# Patient Record
Sex: FEMALE | Race: AMERICAN INDIAN OR ALASKA NATIVE | NOT HISPANIC OR LATINO | Employment: PART TIME | URBAN - METROPOLITAN AREA
[De-identification: names, ages, dates, MRNs, and addresses within clinical notes are randomized per-mention and may not be internally consistent; named-entity substitution may affect disease eponyms.]

---

## 2021-10-08 ENCOUNTER — HISTORICAL (OUTPATIENT)
Dept: HEMATOLOGY/ONCOLOGY | Facility: CLINIC | Age: 46
End: 2021-10-08
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
## 2022-08-08 ENCOUNTER — TELEPHONE (OUTPATIENT)
Dept: ADMINISTRATIVE | Facility: HOSPITAL | Age: 47
End: 2022-08-08
Payer: MEDICAID

## 2022-08-08 NOTE — TELEPHONE ENCOUNTER
CT Chest/Abd/Pel[place] [facility] [place] 8:30 arrival   Patient is aware of appt

## 2022-08-15 ENCOUNTER — OFFICE VISIT (OUTPATIENT)
Dept: HEMATOLOGY/ONCOLOGY | Facility: CLINIC | Age: 47
End: 2022-08-15
Payer: MEDICAID

## 2022-08-15 VITALS
SYSTOLIC BLOOD PRESSURE: 164 MMHG | OXYGEN SATURATION: 100 % | RESPIRATION RATE: 18 BRPM | HEIGHT: 68 IN | TEMPERATURE: 99 F | DIASTOLIC BLOOD PRESSURE: 71 MMHG | HEART RATE: 68 BPM | WEIGHT: 293 LBS | BODY MASS INDEX: 44.41 KG/M2

## 2022-08-15 DIAGNOSIS — C18.4 MALIGNANT NEOPLASM OF TRANSVERSE COLON: Primary | ICD-10-CM

## 2022-08-15 PROCEDURE — 3078F DIAST BP <80 MM HG: CPT | Mod: CPTII,,, | Performed by: INTERNAL MEDICINE

## 2022-08-15 PROCEDURE — 3008F PR BODY MASS INDEX (BMI) DOCUMENTED: ICD-10-PCS | Mod: CPTII,,, | Performed by: INTERNAL MEDICINE

## 2022-08-15 PROCEDURE — 4010F ACE/ARB THERAPY RXD/TAKEN: CPT | Mod: CPTII,,, | Performed by: INTERNAL MEDICINE

## 2022-08-15 PROCEDURE — 4010F PR ACE/ARB THEARPY RXD/TAKEN: ICD-10-PCS | Mod: CPTII,,, | Performed by: INTERNAL MEDICINE

## 2022-08-15 PROCEDURE — 1160F PR REVIEW ALL MEDS BY PRESCRIBER/CLIN PHARMACIST DOCUMENTED: ICD-10-PCS | Mod: CPTII,,, | Performed by: INTERNAL MEDICINE

## 2022-08-15 PROCEDURE — 1160F RVW MEDS BY RX/DR IN RCRD: CPT | Mod: CPTII,,, | Performed by: INTERNAL MEDICINE

## 2022-08-15 PROCEDURE — 99214 OFFICE O/P EST MOD 30 MIN: CPT | Mod: ,,, | Performed by: INTERNAL MEDICINE

## 2022-08-15 PROCEDURE — 3077F PR MOST RECENT SYSTOLIC BLOOD PRESSURE >= 140 MM HG: ICD-10-PCS | Mod: CPTII,,, | Performed by: INTERNAL MEDICINE

## 2022-08-15 PROCEDURE — 3077F SYST BP >= 140 MM HG: CPT | Mod: CPTII,,, | Performed by: INTERNAL MEDICINE

## 2022-08-15 PROCEDURE — 1159F PR MEDICATION LIST DOCUMENTED IN MEDICAL RECORD: ICD-10-PCS | Mod: CPTII,,, | Performed by: INTERNAL MEDICINE

## 2022-08-15 PROCEDURE — 1159F MED LIST DOCD IN RCRD: CPT | Mod: CPTII,,, | Performed by: INTERNAL MEDICINE

## 2022-08-15 PROCEDURE — 3078F PR MOST RECENT DIASTOLIC BLOOD PRESSURE < 80 MM HG: ICD-10-PCS | Mod: CPTII,,, | Performed by: INTERNAL MEDICINE

## 2022-08-15 PROCEDURE — 99214 PR OFFICE/OUTPT VISIT, EST, LEVL IV, 30-39 MIN: ICD-10-PCS | Mod: ,,, | Performed by: INTERNAL MEDICINE

## 2022-08-15 PROCEDURE — 3008F BODY MASS INDEX DOCD: CPT | Mod: CPTII,,, | Performed by: INTERNAL MEDICINE

## 2022-08-15 RX ORDER — ERGOCALCIFEROL 1.25 MG/1
CAPSULE ORAL
COMMUNITY
Start: 2022-08-11
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
## 2022-11-14 NOTE — PROGRESS NOTES
Subjective:       Patient ID: Isamar Calabrese is a 47 y.o. female.    Chief Complaint: Follow-up (Lab results review, pt states that she has been having some abdominal pain all day but it is worst at night. )      Diagnosis:  - Colon ADC IIA T3 N0 G2     Treatment History:  - status post resection 06/2021    Current Treatment:   - surveillance      HPI      Isamar Calabrese  47 y.o.  female with past medical history significant for HLD, HTN, AKIL on CPAP, chronic headaches, arthritis. 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  Status post resection, but no use of postoperative adjuvant chemotherapy.    Interval History:     Patient here for follow-up.  She just had a colonoscopy done on 10/25/2022 showing a polyp.  She is been recommended to follow up in 3 years for repeat colonoscopy.  Denies any blood in the bowel movements.  Denies any nausea vomiting or constipation.  Denies any weight loss.  She has chronic pain in the right lower quadrant which had been persistent after the surgery.          Past Medical History:   Diagnosis Date    ADHD (attention deficit hyperactivity disorder)     Anxiety     Arthritis     Cancer     Chronic headaches     Colon cancer     Diabetes mellitus, type 2     HLD (hyperlipidemia)     Hypertension     AKIL (obstructive sleep apnea)     Vaginal wall prolapse       Past Surgical History:   Procedure Laterality Date    ABLATION OF MEDIAL BRANCH NERVE OF LUMBAR SPINE FACET JOINT Bilateral 11/4/2021    Procedure: ABLATION, NERVE, FACET JOINT, LUMBAR, MEDIAL BRANCH l3,l4,l5;  Surgeon: Anny Betancur MD;  Location: [facility] MAIN OR;  Service: Pain Management;  Laterality: Bilateral;    CARPAL TUNNEL RELEASE Right 05/13/2022    CHOLECYSTECTOMY      COLON SURGERY      INJECTION OF JOINT Bilateral 9/20/2022    Procedure: Injection, Joint, Knee;  Surgeon: Anny Betancur MD;  Location: [facility] MAIN OR;  Service: Pain Management;  Laterality:  Bilateral;     Social History     Socioeconomic History    Marital status: Single   Tobacco Use    Smoking status: Never    Smokeless tobacco: Never   Substance and Sexual Activity    Alcohol use: Yes     Comment: every once in a while/ rare     Drug use: Never      Family History   Problem Relation Age of Onset    Colon polyps Mother     Uterine cancer Maternal Grandmother       Review of patient's allergies indicates:   Allergen Reactions    Hydroxyzine Hives      Review of Systems   Constitutional:  Negative for appetite change and unexpected weight change.   HENT:  Negative for mouth sores.    Eyes:  Negative for visual disturbance.   Respiratory:  Negative for cough and shortness of breath.    Cardiovascular:  Negative for chest pain.   Gastrointestinal:  Positive for abdominal pain and diarrhea.   Genitourinary:  Positive for frequency.   Musculoskeletal:  Negative for back pain.   Integumentary:  Negative for rash.   Neurological:  Negative for headaches.   Hematological:  Negative for adenopathy.   Psychiatric/Behavioral:  The patient is not nervous/anxious.        Objective:        Vitals:    11/15/22 1057   BP: 136/89   Pulse: 73   Resp: 18   Temp: 98 °F (36.7 °C)        Physical Exam  Constitutional:       General: She is not in acute distress.     Appearance: She is well-developed. She is obese. She is not ill-appearing.   HENT:      Head: Normocephalic and atraumatic.      Mouth/Throat:      Mouth: Mucous membranes are moist.   Eyes:      Extraocular Movements: Extraocular movements intact.      Conjunctiva/sclera: Conjunctivae normal.   Cardiovascular:      Rate and Rhythm: Normal rate and regular rhythm.      Heart sounds: Normal heart sounds.   Pulmonary:      Effort: Pulmonary effort is normal. No respiratory distress.      Breath sounds: Normal breath sounds. No wheezing.   Abdominal:      General: Bowel sounds are normal. There is no distension.      Palpations: Abdomen is soft.      Tenderness:  There is no abdominal tenderness.   Musculoskeletal:         General: Normal range of motion.      Cervical back: Normal range of motion and neck supple.   Skin:     General: Skin is warm.   Neurological:      General: No focal deficit present.      Mental Status: She is alert and oriented to person, place, and time. Mental status is at baseline.      Cranial Nerves: No cranial nerve deficit.   Psychiatric:         Mood and Affect: Mood normal.       LABS AND IMAGING REVIEWED IN EPIC  2022:  WBC 11.61, hemoglobin 12.6, hematocrit 38, CEA less than 1.73 which is lower limit of detectable  2022:  WBC 10.04, hemoglobin 12.3, hematocrit 30.0, CEA less than 1.73 which is lower limit of detectable  21 WBC 16.4 Hg 9.7  ANC 12.2  6/3/21 WBC 12 RBC 4.33 Hg 12.9 MCV 91  ANC 8.6 Cr 0.8 Alb 3.4  2021: WBC 11.4, hemoglobin 11.1, platelet 245, CEA < 1.7  December 15, 2021 WBC count 11.57, hemoglobin 11.1, platelet count 174,AST, ALT within normal limits, normal bilirubin.  2022: WBC count 10.13, hemoglobin 11.6, MCV 86.7,. Count 172, creatinine 0.7, AST ALT and alkaline phosphatase within normal limits, CEA< 1.7.    IMAGIN21 CT A/P w/ contrast: Marked circumferential wall thickening involving the right colon at hepatic flexure measuring 4x3.6cm highly suspicious for primary malignancy. Associated constipation involving the proximal right colon with dilatation of proximal right colon. 8mm nodular left adrenal. 7mm mesenteric LN near hepatic flexure, likely reactive    6/3/21 CT A/P w/ contrast: normal liver, spleen, pancreas and adrenals. Large stool burden in right colon. Colonic diverticulosis. Left kidney larger than right. Simple cyst in right kidney.    CT chest with contrast 2021: No evidence of metastatic disease, incidental finding of 4 mm nonspecific sclerotic lesion in the lower sternum likely representing a benign bone island. Status post cholecystectomy      2021 : CT chest abdomen pelvis with contrast with a soft tissue thickening measuring 4 x 3.3 cm with a somewhat masslike configuration without discrete borders, benign postoperative changes versus residual tumor between the loop of distal ileum and lateral abdominal wall. Chest without evidence of tumor or metastatic disease.    1/27/2022 CT abdomen pelvis with contrast: Persistent soft tissue thickening/stranding in the right side of the abdomen, the site of partial colectomy, slightly less pronounced than previously but no discrete mass, findings likely reflect benign postoperative change/fibrosis. A repeat CT of abdomen could be obtained in 6 months to assess for stability, no other significant interval change. No evidence of metastatic disease.    8/10/22 CT CAP:  No acute pulmonary disease or adenopathy in the chest.  Soft tissue prominence at the site of prior ileocolic anastomosis in the right colon appears decreased in comparison.  This finding is less concerning for possible residual/recurrent malignancy    PATH:  6/8/21 terminal ileum, cecum, ascending and transverse colon resection: invasive colonic adenocarcinoma G2. 6.7cm. Extends into subserosal adipose tissue. Multiple discrete tubular adenomas with LGD. Appendix free of tumor. Surgical margins negative, closest radial at 3.5cm. No perforation, LVI-, PNI-, tumor deposits-. 0/28 LNs.  pT3 pN0 G2. Proficient MMR      Assessment:     ECOG Performance status: 0    Cancer of transverse colon C18.4 Stage IIA G2 pT3N0 transverse colon adenocarcinoma w/ no obstruction, no perforation, no LVI, no PNI, negative margins and 0/28 LNs s/p right hemicolectomy 6/8/21.   - Postop CEA less than 1.7. Noted pathology with proficient MMR  - Patient has had a genetic testing done which reported mutations patient has met with genetic counselor with recommendations for testing her children. Her children have been recommended to undergo colonoscopy starting the age of 25.  -  Patient had a colonoscopy in December 2021 at [facility], was found to have 4 polyps which are benign in nature. Will obtain records from pathology.  - colonoscopy 10/25/2022; polyp noted.  Repeat colonoscopy in 3 years       Plan:     - Protonix prescribed.    - repeat CT chest abdomen pelvis before next visit  - Follow-up recheck in 3 months with CBC, CMP, and CEA on RTC.    Continue to follow patient at three-month intervals into the 2 year nicole, then we can stretch out interval visits to six-month nicole until year 5 and then just once a year.      The patient was seen, interviewed and examined. Pertinent lab and radiology studies were reviewed. Pt instructed to call should develop concerning signs/symptoms or have further questions.       Jo Jackson MD  Hematology / Oncology

## 2022-11-15 ENCOUNTER — OFFICE VISIT (OUTPATIENT)
Dept: HEMATOLOGY/ONCOLOGY | Facility: CLINIC | Age: 47
End: 2022-11-15
Payer: MEDICAID

## 2022-11-15 VITALS
WEIGHT: 293 LBS | DIASTOLIC BLOOD PRESSURE: 89 MMHG | RESPIRATION RATE: 18 BRPM | SYSTOLIC BLOOD PRESSURE: 136 MMHG | BODY MASS INDEX: 47.09 KG/M2 | HEART RATE: 73 BPM | TEMPERATURE: 98 F | OXYGEN SATURATION: 99 %

## 2022-11-15 DIAGNOSIS — C18.4 MALIGNANT NEOPLASM OF TRANSVERSE COLON: Primary | ICD-10-CM

## 2022-11-15 DIAGNOSIS — C18.7 MALIGNANT NEOPLASM OF SIGMOID COLON: ICD-10-CM

## 2022-11-15 PROCEDURE — 3079F DIAST BP 80-89 MM HG: CPT | Mod: CPTII,,, | Performed by: INTERNAL MEDICINE

## 2022-11-15 PROCEDURE — 99214 OFFICE O/P EST MOD 30 MIN: CPT | Mod: ,,, | Performed by: INTERNAL MEDICINE

## 2022-11-15 PROCEDURE — 99214 PR OFFICE/OUTPT VISIT, EST, LEVL IV, 30-39 MIN: ICD-10-PCS | Mod: ,,, | Performed by: INTERNAL MEDICINE

## 2022-11-15 PROCEDURE — 3008F BODY MASS INDEX DOCD: CPT | Mod: CPTII,,, | Performed by: INTERNAL MEDICINE

## 2022-11-15 PROCEDURE — 3075F PR MOST RECENT SYSTOLIC BLOOD PRESS GE 130-139MM HG: ICD-10-PCS | Mod: CPTII,,, | Performed by: INTERNAL MEDICINE

## 2022-11-15 PROCEDURE — 3008F PR BODY MASS INDEX (BMI) DOCUMENTED: ICD-10-PCS | Mod: CPTII,,, | Performed by: INTERNAL MEDICINE

## 2022-11-15 PROCEDURE — 4010F ACE/ARB THERAPY RXD/TAKEN: CPT | Mod: CPTII,,, | Performed by: INTERNAL MEDICINE

## 2022-11-15 PROCEDURE — 3075F SYST BP GE 130 - 139MM HG: CPT | Mod: CPTII,,, | Performed by: INTERNAL MEDICINE

## 2022-11-15 PROCEDURE — 4010F PR ACE/ARB THEARPY RXD/TAKEN: ICD-10-PCS | Mod: CPTII,,, | Performed by: INTERNAL MEDICINE

## 2022-11-15 PROCEDURE — 3079F PR MOST RECENT DIASTOLIC BLOOD PRESSURE 80-89 MM HG: ICD-10-PCS | Mod: CPTII,,, | Performed by: INTERNAL MEDICINE

## 2022-11-15 PROCEDURE — 1159F PR MEDICATION LIST DOCUMENTED IN MEDICAL RECORD: ICD-10-PCS | Mod: CPTII,,, | Performed by: INTERNAL MEDICINE

## 2022-11-15 PROCEDURE — 1159F MED LIST DOCD IN RCRD: CPT | Mod: CPTII,,, | Performed by: INTERNAL MEDICINE

## 2022-11-15 RX ORDER — PANTOPRAZOLE SODIUM 40 MG/1
40 TABLET, DELAYED RELEASE ORAL DAILY
Qty: 30 TABLET | Refills: 2 | Status: SHIPPED | OUTPATIENT
Start: 2022-11-15 | End: 2023-05-17

## 2023-02-14 ENCOUNTER — OFFICE VISIT (OUTPATIENT)
Dept: HEMATOLOGY/ONCOLOGY | Facility: CLINIC | Age: 48
End: 2023-02-14
Payer: MEDICAID

## 2023-02-14 VITALS
BODY MASS INDEX: 44.41 KG/M2 | WEIGHT: 293 LBS | DIASTOLIC BLOOD PRESSURE: 56 MMHG | RESPIRATION RATE: 20 BRPM | HEIGHT: 68 IN | SYSTOLIC BLOOD PRESSURE: 133 MMHG | OXYGEN SATURATION: 99 % | TEMPERATURE: 98 F | HEART RATE: 96 BPM

## 2023-02-14 DIAGNOSIS — C18.4 MALIGNANT NEOPLASM OF TRANSVERSE COLON: Primary | ICD-10-CM

## 2023-02-14 PROCEDURE — 1159F MED LIST DOCD IN RCRD: CPT | Mod: CPTII,,,

## 2023-02-14 PROCEDURE — 3008F BODY MASS INDEX DOCD: CPT | Mod: CPTII,,,

## 2023-02-14 PROCEDURE — 3078F PR MOST RECENT DIASTOLIC BLOOD PRESSURE < 80 MM HG: ICD-10-PCS | Mod: CPTII,,,

## 2023-02-14 PROCEDURE — 4010F PR ACE/ARB THEARPY RXD/TAKEN: ICD-10-PCS | Mod: CPTII,,,

## 2023-02-14 PROCEDURE — 99215 PR OFFICE/OUTPT VISIT, EST, LEVL V, 40-54 MIN: ICD-10-PCS | Mod: ,,,

## 2023-02-14 PROCEDURE — 1159F PR MEDICATION LIST DOCUMENTED IN MEDICAL RECORD: ICD-10-PCS | Mod: CPTII,,,

## 2023-02-14 PROCEDURE — 3078F DIAST BP <80 MM HG: CPT | Mod: CPTII,,,

## 2023-02-14 PROCEDURE — 3075F SYST BP GE 130 - 139MM HG: CPT | Mod: CPTII,,,

## 2023-02-14 PROCEDURE — 99215 OFFICE O/P EST HI 40 MIN: CPT | Mod: ,,,

## 2023-02-14 PROCEDURE — 3075F PR MOST RECENT SYSTOLIC BLOOD PRESS GE 130-139MM HG: ICD-10-PCS | Mod: CPTII,,,

## 2023-02-14 PROCEDURE — 4010F ACE/ARB THERAPY RXD/TAKEN: CPT | Mod: CPTII,,,

## 2023-02-14 PROCEDURE — 3008F PR BODY MASS INDEX (BMI) DOCUMENTED: ICD-10-PCS | Mod: CPTII,,,

## 2023-02-14 RX ORDER — LANSOPRAZOLE 30 MG/1
30 CAPSULE, DELAYED RELEASE ORAL DAILY
COMMUNITY

## 2023-02-14 NOTE — PROGRESS NOTES
Subjective:       Patient ID: Isamar Calabrese is a 47 y.o. female.    Chief Complaint: No chief complaint on file.        Diagnosis:  - Colon ADC IIA T3 N0 G2     Treatment History:  - status post resection 06/2021    Current Treatment:   - surveillance      Follow-up  Associated symptoms include abdominal pain. Pertinent negatives include no chest pain, coughing, headaches or rash.       Isamar Calabrese  47 y.o.  female with past medical history significant for HLD, HTN, AKIL on CPAP, chronic headaches, arthritis. 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  Status post resection, but no use of postoperative adjuvant chemotherapy.    Interval History:     Patient here for follow-up.  She just had a colonoscopy done on 10/25/2022 showing a polyp.  She has been recommended to follow up in 3 years for repeat colonoscopy.  She continues with chronic diarrhea for the last 8-9 years, s/p cholecystectomy. She does follow GI for management of diarrhea and GERD. She has a f/u next week. Denies any blood in the bowel movements.  Denies any nausea vomiting or constipation.  Denies any weight loss.  She has chronic pain in the right lower quadrant which had been persistent after the surgery.          Past Medical History:   Diagnosis Date    ADHD (attention deficit hyperactivity disorder)     Anxiety     Arthritis     Cancer     Chronic headaches     Colon cancer     Diabetes mellitus, type 2     HLD (hyperlipidemia)     Hypertension     AKIL (obstructive sleep apnea)     Vaginal wall prolapse       Past Surgical History:   Procedure Laterality Date    ABLATION OF MEDIAL BRANCH NERVE OF LUMBAR SPINE FACET JOINT Bilateral 11/4/2021    Procedure: ABLATION, NERVE, FACET JOINT, LUMBAR, MEDIAL BRANCH l3,l4,l5;  Surgeon: Anny Betancur MD;  Location: [facility] MAIN OR;  Service: Pain Management;  Laterality: Bilateral;    CARPAL TUNNEL RELEASE Right 05/13/2022    CHOLECYSTECTOMY       COLON SURGERY      INJECTION OF JOINT Bilateral 9/20/2022    Procedure: Injection, Joint, Knee;  Surgeon: Anny Betancur MD;  Location: [facility] MAIN OR;  Service: Pain Management;  Laterality: Bilateral;     Social History     Socioeconomic History    Marital status: Single   Tobacco Use    Smoking status: Never    Smokeless tobacco: Never   Substance and Sexual Activity    Alcohol use: Yes     Comment: every once in a while/ rare     Drug use: Never      Family History   Problem Relation Age of Onset    Colon polyps Mother     Uterine cancer Maternal Grandmother       Review of patient's allergies indicates:   Allergen Reactions    Hydroxyzine Hives      Review of Systems   Constitutional:  Negative for appetite change and unexpected weight change.   HENT:  Negative for mouth sores.    Eyes:  Negative for visual disturbance.   Respiratory:  Negative for cough and shortness of breath.    Cardiovascular:  Negative for chest pain.   Gastrointestinal:  Positive for abdominal pain and diarrhea.   Genitourinary:  Positive for frequency.   Musculoskeletal:  Negative for back pain.   Integumentary:  Negative for rash.   Neurological:  Negative for headaches.   Hematological:  Negative for adenopathy.   Psychiatric/Behavioral:  The patient is not nervous/anxious.        Objective:        Vitals:    02/14/23 1104   BP: (!) 133/56   Pulse: 96   Resp: 20   Temp: 98.1 °F (36.7 °C)          Physical Exam  Constitutional:       General: She is not in acute distress.     Appearance: She is well-developed. She is obese. She is not ill-appearing.   HENT:      Head: Normocephalic and atraumatic.      Mouth/Throat:      Mouth: Mucous membranes are moist.   Eyes:      Extraocular Movements: Extraocular movements intact.      Conjunctiva/sclera: Conjunctivae normal.   Cardiovascular:      Rate and Rhythm: Normal rate and regular rhythm.      Heart sounds: Normal heart sounds.   Pulmonary:      Effort: Pulmonary effort is normal. No  respiratory distress.      Breath sounds: Normal breath sounds. No wheezing.   Abdominal:      General: Bowel sounds are normal. There is no distension.      Palpations: Abdomen is soft.      Tenderness: There is no abdominal tenderness.   Musculoskeletal:         General: Normal range of motion.      Cervical back: Normal range of motion and neck supple.   Skin:     General: Skin is warm.   Neurological:      General: No focal deficit present.      Mental Status: She is alert and oriented to person, place, and time. Mental status is at baseline.      Cranial Nerves: No cranial nerve deficit.   Psychiatric:         Mood and Affect: Mood normal.       LABS AND IMAGING REVIEWED IN EPIC  23 CEA <1.73Cr 0.85, alb 3.4, ca 9.29, LFTs WNL, wbc 9.84, hgb 12.4, plt 159, ANC 6.77  2022:  WBC 11.61, hemoglobin 12.6, hematocrit 38, CEA less than 1.73 which is lower limit of detectable  2022:  WBC 10.04, hemoglobin 12.3, hematocrit 30.0, CEA less than 1.73 which is lower limit of detectable  21 WBC 16.4 Hg 9.7  ANC 12.2  6/3/21 WBC 12 RBC 4.33 Hg 12.9 MCV 91  ANC 8.6 Cr 0.8 Alb 3.4  2021: WBC 11.4, hemoglobin 11.1, platelet 245, CEA < 1.7  December 15, 2021 WBC count 11.57, hemoglobin 11.1, platelet count 174,AST, ALT within normal limits, normal bilirubin.  2022: WBC count 10.13, hemoglobin 11.6, MCV 86.7,. Count 172, creatinine 0.7, AST ALT and alkaline phosphatase within normal limits, CEA< 1.7.    IMAGIN21 CT A/P w/ contrast: Marked circumferential wall thickening involving the right colon at hepatic flexure measuring 4x3.6cm highly suspicious for primary malignancy. Associated constipation involving the proximal right colon with dilatation of proximal right colon. 8mm nodular left adrenal. 7mm mesenteric LN near hepatic flexure, likely reactive    6/3/21 CT A/P w/ contrast: normal liver, spleen, pancreas and adrenals. Large stool burden in right colon. Colonic  diverticulosis. Left kidney larger than right. Simple cyst in right kidney.    CT chest with contrast 9/1/2021: No evidence of metastatic disease, incidental finding of 4 mm nonspecific sclerotic lesion in the lower sternum likely representing a benign bone island. Status post cholecystectomy    December 9, 2021 : CT chest abdomen pelvis with contrast with a soft tissue thickening measuring 4 x 3.3 cm with a somewhat masslike configuration without discrete borders, benign postoperative changes versus residual tumor between the loop of distal ileum and lateral abdominal wall. Chest without evidence of tumor or metastatic disease.    1/27/2022 CT abdomen pelvis with contrast: Persistent soft tissue thickening/stranding in the right side of the abdomen, the site of partial colectomy, slightly less pronounced than previously but no discrete mass, findings likely reflect benign postoperative change/fibrosis. A repeat CT of abdomen could be obtained in 6 months to assess for stability, no other significant interval change. No evidence of metastatic disease.    8/10/22 CT CAP:  No acute pulmonary disease or adenopathy in the chest.  Soft tissue prominence at the site of prior ileocolic anastomosis in the right colon appears decreased in comparison.  This finding is less concerning for possible residual/recurrent malignancy    02/09/22 CT CAP: No evidence of metastatic disease. 3.8 cm right ovarian noted.    PATH:  6/8/21 terminal ileum, cecum, ascending and transverse colon resection: invasive colonic adenocarcinoma G2. 6.7cm. Extends into subserosal adipose tissue. Multiple discrete tubular adenomas with LGD. Appendix free of tumor. Surgical margins negative, closest radial at 3.5cm. No perforation, LVI-, PNI-, tumor deposits-. 0/28 LNs.  pT3 pN0 G2. Proficient MMR      Assessment:     ECOG Performance status: 0    Cancer of transverse colon C18.4 Stage IIA G2 pT3N0 transverse colon adenocarcinoma w/ no obstruction, no  perforation, no LVI, no PNI, negative margins and 0/28 LNs s/p right hemicolectomy 6/8/21.   - Postop CEA less than 1.7. Noted pathology with proficient MMR  - Patient has had a genetic testing done which reported mutations patient has met with genetic counselor with recommendations for testing her children. Her children have been recommended to undergo colonoscopy starting the age of 25.  - Patient had a colonoscopy in December 2021 at [facility], was found to have 4 polyps which are benign in nature. Will obtain records from pathology.  - colonoscopy 10/25/2022; polyp noted.  Repeat colonoscopy in 3 years       Plan:     - CT faxed to Dr. Parisi her gynecologist for evaluation right ovarian cyst.  - repeat CT chest abdomen pelvis before next visit  - Follow-up recheck in 3 months with CBC, CMP, and CEA on RTC.      Continue to follow patient at three-month intervals into the 2 year nicole, then we can stretch out interval visits to six-month nicole until year 5 and then just once a year.

## 2023-05-17 ENCOUNTER — OFFICE VISIT (OUTPATIENT)
Dept: HEMATOLOGY/ONCOLOGY | Facility: CLINIC | Age: 48
End: 2023-05-17
Payer: MEDICAID

## 2023-05-17 VITALS
TEMPERATURE: 98 F | WEIGHT: 293 LBS | SYSTOLIC BLOOD PRESSURE: 143 MMHG | OXYGEN SATURATION: 98 % | HEIGHT: 68 IN | DIASTOLIC BLOOD PRESSURE: 77 MMHG | BODY MASS INDEX: 44.41 KG/M2 | RESPIRATION RATE: 20 BRPM | HEART RATE: 52 BPM

## 2023-05-17 DIAGNOSIS — C18.4 MALIGNANT NEOPLASM OF TRANSVERSE COLON: Primary | ICD-10-CM

## 2023-05-17 PROCEDURE — 3077F SYST BP >= 140 MM HG: CPT | Mod: CPTII,,, | Performed by: NURSE PRACTITIONER

## 2023-05-17 PROCEDURE — 1160F RVW MEDS BY RX/DR IN RCRD: CPT | Mod: CPTII,,, | Performed by: NURSE PRACTITIONER

## 2023-05-17 PROCEDURE — 3077F PR MOST RECENT SYSTOLIC BLOOD PRESSURE >= 140 MM HG: ICD-10-PCS | Mod: CPTII,,, | Performed by: NURSE PRACTITIONER

## 2023-05-17 PROCEDURE — 4010F ACE/ARB THERAPY RXD/TAKEN: CPT | Mod: CPTII,,, | Performed by: NURSE PRACTITIONER

## 2023-05-17 PROCEDURE — 1159F MED LIST DOCD IN RCRD: CPT | Mod: CPTII,,, | Performed by: NURSE PRACTITIONER

## 2023-05-17 PROCEDURE — 3078F PR MOST RECENT DIASTOLIC BLOOD PRESSURE < 80 MM HG: ICD-10-PCS | Mod: CPTII,,, | Performed by: NURSE PRACTITIONER

## 2023-05-17 PROCEDURE — 3008F PR BODY MASS INDEX (BMI) DOCUMENTED: ICD-10-PCS | Mod: CPTII,,, | Performed by: NURSE PRACTITIONER

## 2023-05-17 PROCEDURE — 99214 OFFICE O/P EST MOD 30 MIN: CPT | Mod: ,,, | Performed by: NURSE PRACTITIONER

## 2023-05-17 PROCEDURE — 99214 PR OFFICE/OUTPT VISIT, EST, LEVL IV, 30-39 MIN: ICD-10-PCS | Mod: ,,, | Performed by: NURSE PRACTITIONER

## 2023-05-17 PROCEDURE — 3008F BODY MASS INDEX DOCD: CPT | Mod: CPTII,,, | Performed by: NURSE PRACTITIONER

## 2023-05-17 PROCEDURE — 1160F PR REVIEW ALL MEDS BY PRESCRIBER/CLIN PHARMACIST DOCUMENTED: ICD-10-PCS | Mod: CPTII,,, | Performed by: NURSE PRACTITIONER

## 2023-05-17 PROCEDURE — 4010F PR ACE/ARB THEARPY RXD/TAKEN: ICD-10-PCS | Mod: CPTII,,, | Performed by: NURSE PRACTITIONER

## 2023-05-17 PROCEDURE — 1159F PR MEDICATION LIST DOCUMENTED IN MEDICAL RECORD: ICD-10-PCS | Mod: CPTII,,, | Performed by: NURSE PRACTITIONER

## 2023-05-17 PROCEDURE — 3078F DIAST BP <80 MM HG: CPT | Mod: CPTII,,, | Performed by: NURSE PRACTITIONER

## 2023-05-17 RX ORDER — DEXLANSOPRAZOLE 60 MG/1
1 CAPSULE, DELAYED RELEASE ORAL DAILY
COMMUNITY
Start: 2023-04-26

## 2023-05-17 NOTE — PROGRESS NOTES
Subjective:       Patient ID: Isamar Calabrese is a 48 y.o. female.    Chief Complaint: Follow-up (Pt c/o pain on rt side)        Diagnosis:  - Colon ADC IIA T3 N0 G2     Treatment History:  - status post resection 06/2021    Current Treatment:   - surveillance      Follow-up  Associated symptoms include abdominal pain. Pertinent negatives include no chest pain, coughing, headaches or rash.       Isamar Calabrese  48 y.o.  female with past medical history significant for HLD, HTN, AKIL on CPAP, chronic headaches, arthritis. 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  Status post resection, but no use of postoperative adjuvant chemotherapy.    Interval History:     Patient here for follow-up. She states that she is doing well overall without any new complaints. She did have a MMG in 4/27/2023 and follow-up MMG and US 5/2023 that showed a small mass within the right breast, she is scheduled for a biopsy 5/25/23 She had a colonoscopy done on 10/25/2022 showing a polyp.  She has been recommended to follow up in 3 years for repeat colonoscopy.  She continues with chronic diarrhea for the last 8-9 years, s/p cholecystectomy. She does follow GI for management of diarrhea and GERD.  Denies any blood in the bowel movements.  Denies any nausea/vomiting/constipation or weight loss.  She has chronic pain in the right lower quadrant which had been persistent after the surgery. Denies night sweats, bleeding, shortness of breath, fever, chest pain, or cough.          Past Medical History:   Diagnosis Date    ADHD (attention deficit hyperactivity disorder)     Anxiety     Arthritis     Cancer     Chronic headaches     Colon cancer     Diabetes mellitus, type 2     HLD (hyperlipidemia)     Hypertension     AKIL (obstructive sleep apnea)     Vaginal wall prolapse       Past Surgical History:   Procedure Laterality Date    ABLATION OF MEDIAL BRANCH NERVE OF LUMBAR SPINE FACET JOINT  Bilateral 11/4/2021    Procedure: ABLATION, NERVE, FACET JOINT, LUMBAR, MEDIAL BRANCH l3,l4,l5;  Surgeon: Anny Betancur MD;  Location: [facility] MAIN OR;  Service: Pain Management;  Laterality: Bilateral;    CARPAL TUNNEL RELEASE Right 05/13/2022    CHOLECYSTECTOMY      COLON SURGERY      INJECTION OF JOINT Bilateral 9/20/2022    Procedure: Injection, Joint, Knee;  Surgeon: Anny Betancur MD;  Location: [facility] MAIN OR;  Service: Pain Management;  Laterality: Bilateral;     Social History     Socioeconomic History    Marital status: Single   Tobacco Use    Smoking status: Never    Smokeless tobacco: Never   Substance and Sexual Activity    Alcohol use: Yes     Comment: every once in a while/ rare     Drug use: Never      Family History   Problem Relation Age of Onset    Colon polyps Mother     Uterine cancer Maternal Grandmother       Review of patient's allergies indicates:   Allergen Reactions    Hydroxyzine Hives      Review of Systems   Constitutional:  Negative for appetite change and unexpected weight change.   HENT:  Negative for mouth sores.    Eyes:  Negative for visual disturbance.   Respiratory:  Negative for cough and shortness of breath.    Cardiovascular:  Negative for chest pain.   Gastrointestinal:  Positive for abdominal pain and diarrhea.   Musculoskeletal:  Negative for back pain.   Integumentary:  Negative for rash.   Neurological:  Negative for headaches.   Hematological:  Negative for adenopathy.   Psychiatric/Behavioral:  The patient is not nervous/anxious.        Objective:        Vitals:    05/17/23 1001   BP: (!) 143/77   Pulse: (!) 52   Resp: 20   Temp: 98 °F (36.7 °C)          Physical Exam  Constitutional:       General: She is not in acute distress.     Appearance: She is well-developed. She is obese. She is not ill-appearing.   HENT:      Head: Normocephalic and atraumatic.      Mouth/Throat:      Mouth: Mucous membranes are moist.   Eyes:      Extraocular Movements: Extraocular movements  intact.      Conjunctiva/sclera: Conjunctivae normal.   Cardiovascular:      Rate and Rhythm: Normal rate and regular rhythm.      Heart sounds: Normal heart sounds.   Pulmonary:      Effort: Pulmonary effort is normal. No respiratory distress.      Breath sounds: Normal breath sounds. No wheezing.   Abdominal:      General: Bowel sounds are normal. There is no distension.      Palpations: Abdomen is soft.      Tenderness: There is no abdominal tenderness.   Musculoskeletal:         General: Normal range of motion.      Cervical back: Normal range of motion and neck supple.   Skin:     General: Skin is warm.      Comments: Skin graft right side   Neurological:      General: No focal deficit present.      Mental Status: She is alert and oriented to person, place, and time. Mental status is at baseline.      Cranial Nerves: No cranial nerve deficit.   Psychiatric:         Mood and Affect: Mood normal.       LABS AND IMAGING REVIEWED IN Epic  23 CEA 1.8 Cr 0.87, alb 3.2, ca 9.18 corrected 998 , LFTs WNL, wbc 11.42 , hgb 11.8, plt 166, ANC 8.08  23 CEA <1.73Cr 0.85, alb 3.4, ca 9.29, LFTs WNL, wbc 9.84, hgb 12.4, plt 159, ANC 6.77  2022:  WBC 11.61, hemoglobin 12.6, hematocrit 38, CEA less than 1.73 which is lower limit of detectable  2022:  WBC 10.04, hemoglobin 12.3, hematocrit 30.0, CEA less than 1.73 which is lower limit of detectable  21 WBC 16.4 Hg 9.7  ANC 12.2  6/3/21 WBC 12 RBC 4.33 Hg 12.9 MCV 91  ANC 8.6 Cr 0.8 Alb 3.4  2021: WBC 11.4, hemoglobin 11.1, platelet 245, CEA < 1.7  December 15, 2021 WBC count 11.57, hemoglobin 11.1, platelet count 174,AST, ALT within normal limits, normal bilirubin.  2022: WBC count 10.13, hemoglobin 11.6, MCV 86.7,. Count 172, creatinine 0.7, AST ALT and alkaline phosphatase within normal limits, CEA< 1.7.    IMAGIN21 CT A/P w/ contrast: Marked circumferential wall thickening involving the right colon at hepatic flexure  measuring 4x3.6cm highly suspicious for primary malignancy. Associated constipation involving the proximal right colon with dilatation of proximal right colon. 8mm nodular left adrenal. 7mm mesenteric LN near hepatic flexure, likely reactive    6/3/21 CT A/P w/ contrast: normal liver, spleen, pancreas and adrenals. Large stool burden in right colon. Colonic diverticulosis. Left kidney larger than right. Simple cyst in right kidney.    CT chest with contrast 9/1/2021: No evidence of metastatic disease, incidental finding of 4 mm nonspecific sclerotic lesion in the lower sternum likely representing a benign bone island. Status post cholecystectomy    December 9, 2021 : CT chest abdomen pelvis with contrast with a soft tissue thickening measuring 4 x 3.3 cm with a somewhat masslike configuration without discrete borders, benign postoperative changes versus residual tumor between the loop of distal ileum and lateral abdominal wall. Chest without evidence of tumor or metastatic disease.    1/27/2022 CT abdomen pelvis with contrast: Persistent soft tissue thickening/stranding in the right side of the abdomen, the site of partial colectomy, slightly less pronounced than previously but no discrete mass, findings likely reflect benign postoperative change/fibrosis. A repeat CT of abdomen could be obtained in 6 months to assess for stability, no other significant interval change. No evidence of metastatic disease.    8/10/22 CT CAP:  No acute pulmonary disease or adenopathy in the chest.  Soft tissue prominence at the site of prior ileocolic anastomosis in the right colon appears decreased in comparison.  This finding is less concerning for possible residual/recurrent malignancy    02/09/23 CT CAP: No evidence of metastatic disease. 3.8 cm right ovarian noted.    PATH:  6/8/21 terminal ileum, cecum, ascending and transverse colon resection: invasive colonic adenocarcinoma G2. 6.7cm. Extends into subserosal adipose tissue.  Multiple discrete tubular adenomas with LGD. Appendix free of tumor. Surgical margins negative, closest radial at 3.5cm. No perforation, LVI-, PNI-, tumor deposits-. 0/28 LNs.  pT3 pN0 G2. Proficient MMR      Assessment:     ECOG Performance status: 0    Cancer of transverse colon C18.4 Stage IIA G2 pT3N0 transverse colon adenocarcinoma w/ no obstruction, no perforation, no LVI, no PNI, negative margins and 0/28 LNs s/p right hemicolectomy 6/8/21.   - Postop CEA less than 1.7. Noted pathology with proficient MMR  - Patient has had a genetic testing done which reported mutations patient has met with genetic counselor with recommendations for testing her children. Her children have been recommended to undergo colonoscopy starting the age of 25.  - Patient had a colonoscopy in December 2021 at [facility], was found to have 4 polyps which are benign in nature. Will obtain records from pathology.  - colonoscopy 10/25/2022; polyp noted.  Repeat colonoscopy in 3 years       Plan:     - Abnormal MMG, pt to have biopsy 5/24/23  - repeat CT chest abdomen pelvis before next visit- insurance denied previous request for scans due to pt having scan done in 2/2023   - Follow-up recheck in 3 months with CBC, CMP, and CEA CT CAP on RTC.      Continue to follow patient at three-month intervals into the 2 year nicole, then we can stretch out interval visits to six-month nicole until year 5 and then just once a year.    Pt instructed to call in the interim for any new or worsening concerns or symptoms      [facility] [place]

## 2023-06-07 DIAGNOSIS — N63.10 BREAST MASS, RIGHT: Primary | ICD-10-CM

## 2023-06-29 ENCOUNTER — OFFICE VISIT (OUTPATIENT)
Dept: SURGERY | Facility: CLINIC | Age: 48
End: 2023-06-29
Payer: MEDICAID

## 2023-06-29 VITALS
HEIGHT: 68 IN | DIASTOLIC BLOOD PRESSURE: 70 MMHG | TEMPERATURE: 99 F | BODY MASS INDEX: 44.01 KG/M2 | SYSTOLIC BLOOD PRESSURE: 125 MMHG | WEIGHT: 290.38 LBS | OXYGEN SATURATION: 96 % | HEART RATE: 68 BPM

## 2023-06-29 DIAGNOSIS — N63.10 BREAST MASS, RIGHT: ICD-10-CM

## 2023-06-29 PROCEDURE — 3078F DIAST BP <80 MM HG: CPT | Mod: CPTII,,, | Performed by: SURGERY

## 2023-06-29 PROCEDURE — 1159F PR MEDICATION LIST DOCUMENTED IN MEDICAL RECORD: ICD-10-PCS | Mod: CPTII,,, | Performed by: SURGERY

## 2023-06-29 PROCEDURE — 4010F ACE/ARB THERAPY RXD/TAKEN: CPT | Mod: CPTII,,, | Performed by: SURGERY

## 2023-06-29 PROCEDURE — 99203 OFFICE O/P NEW LOW 30 MIN: CPT | Mod: S$PBB,,, | Performed by: SURGERY

## 2023-06-29 PROCEDURE — 1159F MED LIST DOCD IN RCRD: CPT | Mod: CPTII,,, | Performed by: SURGERY

## 2023-06-29 PROCEDURE — 3074F SYST BP LT 130 MM HG: CPT | Mod: CPTII,,, | Performed by: SURGERY

## 2023-06-29 PROCEDURE — 3008F PR BODY MASS INDEX (BMI) DOCUMENTED: ICD-10-PCS | Mod: CPTII,,, | Performed by: SURGERY

## 2023-06-29 PROCEDURE — 3078F PR MOST RECENT DIASTOLIC BLOOD PRESSURE < 80 MM HG: ICD-10-PCS | Mod: CPTII,,, | Performed by: SURGERY

## 2023-06-29 PROCEDURE — 3074F PR MOST RECENT SYSTOLIC BLOOD PRESSURE < 130 MM HG: ICD-10-PCS | Mod: CPTII,,, | Performed by: SURGERY

## 2023-06-29 PROCEDURE — 4010F PR ACE/ARB THEARPY RXD/TAKEN: ICD-10-PCS | Mod: CPTII,,, | Performed by: SURGERY

## 2023-06-29 PROCEDURE — 99203 PR OFFICE/OUTPT VISIT, NEW, LEVL III, 30-44 MIN: ICD-10-PCS | Mod: S$PBB,,, | Performed by: SURGERY

## 2023-06-29 PROCEDURE — 3008F BODY MASS INDEX DOCD: CPT | Mod: CPTII,,, | Performed by: SURGERY

## 2023-06-29 PROCEDURE — 99214 OFFICE O/P EST MOD 30 MIN: CPT | Mod: PBBFAC
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
## 2023-06-29 NOTE — PROGRESS NOTES
Patient seen by Dr. VICTORINA Daily. Will return prn. Written and verbal discharge instructions given.

## 2023-07-05 DIAGNOSIS — C18.4 MALIGNANT NEOPLASM OF TRANSVERSE COLON: Primary | ICD-10-CM

## 2023-08-16 NOTE — PROGRESS NOTES
Subjective:       Patient ID: Isamar Calabrese is a 48 y.o. female.    Chief Complaint: Follow-up        Diagnosis:  - Colon ADC IIA T3 N0 G2     Treatment History:  - status post resection 06/2021    Current Treatment:   - surveillance      Follow-up  Pertinent negatives include no abdominal pain, chest pain, coughing, headaches or rash.         Isamar Calabrese  48 y.o.  female with past medical history significant for HLD, HTN, AKIL on CPAP, chronic headaches, arthritis. 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  Status post resection, but no use of postoperative adjuvant chemotherapy.    Interval History:   Today, 08/17/2023, patient denies any acute concerns today.  She denies any change in her bowel habits, blood in his stools, dark tarry stools, easy bruising, bleeding, decreased appetite, weight loss.          Past Medical History:   Diagnosis Date    ADHD (attention deficit hyperactivity disorder)     Anxiety     Arthritis     Cancer     Chronic headaches     Colon cancer     Diabetes mellitus, type 2     HLD (hyperlipidemia)     Hypertension     AKIL (obstructive sleep apnea)     Urinary incontinence     Long time    Vaginal wall prolapse       Past Surgical History:   Procedure Laterality Date    ABLATION OF MEDIAL BRANCH NERVE OF LUMBAR SPINE FACET JOINT Bilateral 11/4/2021    Procedure: ABLATION, NERVE, FACET JOINT, LUMBAR, MEDIAL BRANCH l3,l4,l5;  Surgeon: Anny Betancur MD;  Location: [facility] MAIN OR;  Service: Pain Management;  Laterality: Bilateral;    CARPAL TUNNEL RELEASE Right 05/13/2022    CHOLECYSTECTOMY      COLON SURGERY      INJECTION OF JOINT Bilateral 9/20/2022    Procedure: Injection, Joint, Knee;  Surgeon: Anny Betancur MD;  Location: [facility] MAIN OR;  Service: Pain Management;  Laterality: Bilateral;    INJECTION OF JOINT Bilateral 6/22/2023    Procedure: Injection, Joint, Knee;  Surgeon: Anny Betancur MD;  Location: [facility] MAIN OR;   Service: Pain Management;  Laterality: Bilateral;     Social History     Socioeconomic History    Marital status: Single   Tobacco Use    Smoking status: Never    Smokeless tobacco: Never   Substance and Sexual Activity    Alcohol use: Yes     Alcohol/week: 1.0 standard drink of alcohol     Types: 1 Drinks containing 0.5 oz of alcohol per week     Comment: Maybe 1 every or 2 every two of three months    Drug use: Never    Sexual activity: Not Currently     Partners: Male     Birth control/protection: None     Comment: Maybe every three or four months      Family History   Problem Relation Age of Onset    Colon polyps Mother     Arthritis Mother     Uterine cancer Maternal Grandmother     Cervical cancer Maternal Grandmother     Rheum arthritis Maternal Uncle     Arthritis Maternal Uncle     Osteoarthritis Sister     Hypertension Sister       Review of patient's allergies indicates:   Allergen Reactions    Hydroxyzine Hives      Review of Systems   Constitutional:  Negative for appetite change and unexpected weight change.   HENT:  Negative for mouth sores.    Eyes:  Negative for visual disturbance.   Respiratory:  Negative for cough and shortness of breath.    Cardiovascular:  Negative for chest pain.   Gastrointestinal:  Negative for abdominal pain and diarrhea.   Genitourinary:  Negative for frequency.   Musculoskeletal:  Negative for back pain.   Integumentary:  Negative for rash.   Neurological:  Negative for headaches.   Hematological:  Negative for adenopathy.   Psychiatric/Behavioral:  The patient is not nervous/anxious.          Objective:        Vitals:    08/17/23 1018   BP: 114/71   Pulse: 62   Resp: 18   Temp: 98.8 °F (37.1 °C)            Physical Exam  Constitutional:       General: She is not in acute distress.     Appearance: She is well-developed. She is obese. She is not ill-appearing.   HENT:      Head: Normocephalic and atraumatic.      Mouth/Throat:      Mouth: Mucous membranes are moist.   Eyes:       Extraocular Movements: Extraocular movements intact.      Conjunctiva/sclera: Conjunctivae normal.   Cardiovascular:      Rate and Rhythm: Normal rate and regular rhythm.      Heart sounds: Normal heart sounds.   Pulmonary:      Effort: Pulmonary effort is normal. No respiratory distress.      Breath sounds: Normal breath sounds. No wheezing.   Abdominal:      General: Bowel sounds are normal. There is no distension.      Palpations: Abdomen is soft.      Tenderness: There is no abdominal tenderness.   Musculoskeletal:         General: Normal range of motion.      Cervical back: Normal range of motion and neck supple.   Skin:     General: Skin is warm.      Comments: Skin graft right side   Neurological:      General: No focal deficit present.      Mental Status: She is alert and oriented to person, place, and time. Mental status is at baseline.      Cranial Nerves: No cranial nerve deficit.   Psychiatric:         Mood and Affect: Mood normal.       LABS AND IMAGING REVIEWED     08/11/2023 CEA less than 1.73, CMP unremarkable, WBC count 10.5, hemoglobin 11.7, MCV 89, platelet count 144.  5/16/23 CEA 1.8 Cr 0.87, alb 3.2, ca 9.18 corrected 9/98 , LFTs WNL, wbc 11.42 , hgb 11.8, plt 166, ANC 8.08  02/07/23 CEA <1.73Cr 0.85, alb 3.4, ca 9.29, LFTs WNL, wbc 9.84, hgb 12.4, plt 159, ANC 6.77  11/8/2022:  WBC 11.61, hemoglobin 12.6, hematocrit 38, CEA less than 1.73 which is lower limit of detectable  08/09/2022:  WBC 10.04, hemoglobin 12.3, hematocrit 30.0, CEA less than 1.73 which is lower limit of detectable  6/12/21 WBC 16.4 Hg 9.7  ANC 12.2  6/3/21 WBC 12 RBC 4.33 Hg 12.9 MCV 91  ANC 8.6 Cr 0.8 Alb 3.4  8/16/2021: WBC 11.4, hemoglobin 11.1, platelet 245, CEA < 1.7  December 15, 2021 WBC count 11.57, hemoglobin 11.1, platelet count 174,AST, ALT within normal limits, normal bilirubin.  2/8/2022: WBC count 10.13, hemoglobin 11.6, MCV 86.7,. Count 172, creatinine 0.7, AST ALT and alkaline phosphatase  within normal limits, CEA< 1.7.    IMAGIN21 CT A/P w/ contrast: Marked circumferential wall thickening involving the right colon at hepatic flexure measuring 4x3.6cm highly suspicious for primary malignancy. Associated constipation involving the proximal right colon with dilatation of proximal right colon. 8mm nodular left adrenal. 7mm mesenteric LN near hepatic flexure, likely reactive    6/3/21 CT A/P w/ contrast: normal liver, spleen, pancreas and adrenals. Large stool burden in right colon. Colonic diverticulosis. Left kidney larger than right. Simple cyst in right kidney.    CT chest with contrast 2021: No evidence of metastatic disease, incidental finding of 4 mm nonspecific sclerotic lesion in the lower sternum likely representing a benign bone island. Status post cholecystectomy    2021 : CT chest abdomen pelvis with contrast with a soft tissue thickening measuring 4 x 3.3 cm with a somewhat masslike configuration without discrete borders, benign postoperative changes versus residual tumor between the loop of distal ileum and lateral abdominal wall. Chest without evidence of tumor or metastatic disease.    2022 CT abdomen pelvis with contrast: Persistent soft tissue thickening/stranding in the right side of the abdomen, the site of partial colectomy, slightly less pronounced than previously but no discrete mass, findings likely reflect benign postoperative change/fibrosis. A repeat CT of abdomen could be obtained in 6 months to assess for stability, no other significant interval change. No evidence of metastatic disease.    8/10/22 CT CAP:  No acute pulmonary disease or adenopathy in the chest.  Soft tissue prominence at the site of prior ileocolic anastomosis in the right colon appears decreased in comparison.  This finding is less concerning for possible residual/recurrent malignancy    23 CT CAP: No evidence of metastatic disease. 3.8 cm right ovarian  noted.      08/14/2023 CT chest abdomen pelvis with contrast:  No evidence of metastatic disease in the chest, small stable lesion within medial aspect of the right lobe of the liver, possibly small cysts.  2.2 cm left ovarian cyst.    PATH:      05/26/2023 right breast Biopsy benign intraductal papilloma      6/8/21 terminal ileum, cecum, ascending and transverse colon resection: invasive colonic adenocarcinoma G2. 6.7cm. Extends into subserosal adipose tissue. Multiple discrete tubular adenomas with LGD. Appendix free of tumor. Surgical margins negative, closest radial at 3.5cm. No perforation, LVI-, PNI-, tumor deposits-. 0/28 LNs.  pT3 pN0 G2. Proficient MMR      Assessment:     ECOG Performance status: 0    Cancer of transverse colon C18.4 Stage IIA G2 pT3N0 transverse colon adenocarcinoma w/ no obstruction, no perforation, no LVI, no PNI, negative margins and 0/28 LNs s/p right hemicolectomy 6/8/21.   - Postop CEA less than 1.7. Noted pathology with proficient MMR  - Patient has had a genetic testing done which reported mutations patient has met with genetic counselor with recommendations for testing her children. Her children have been recommended to undergo colonoscopy starting the age of 25.  - Patient had a colonoscopy in December 2021 at [facility], was found to have 4 polyps which are benign in nature. Will obtain records from pathology.  Colonoscopy 10/25/2022; polyp noted.  Repeat colonoscopy in 3 years  CT chest abdomen pelvis with contrast in August 2023 unremarkable    Plan:     Patient was finished 2 years of surveillance to date   Will plan to follow-up in clinic q.6 months with labs and CT chest abdomen pelvis with contrast every 6 months for surveillance for total of 5 years.  Screening mammograms per PCP   Plan to follow-up in 6 months with repeat labs and CT chest abdomen pelvis with contrast    A total of  30 minutes were spent in review of records, interpretation of test,  coordination of care, discussion and counseling with the patient.          Portions of the record may have been created with voice recognition software. Occasional wrong-word or sound-a-like substitutions may have occurred due to the inherent limitations of voice recognition software. Read the chart carefully and recognize, using context, where substitutions have occurred.

## 2023-08-17 ENCOUNTER — OFFICE VISIT (OUTPATIENT)
Dept: HEMATOLOGY/ONCOLOGY | Facility: CLINIC | Age: 48
End: 2023-08-17
Payer: MEDICAID

## 2023-08-17 VITALS
TEMPERATURE: 99 F | WEIGHT: 293 LBS | HEIGHT: 68 IN | OXYGEN SATURATION: 97 % | HEART RATE: 62 BPM | DIASTOLIC BLOOD PRESSURE: 71 MMHG | RESPIRATION RATE: 18 BRPM | SYSTOLIC BLOOD PRESSURE: 114 MMHG | BODY MASS INDEX: 44.41 KG/M2

## 2023-08-17 DIAGNOSIS — C18.4 MALIGNANT NEOPLASM OF TRANSVERSE COLON: Primary | ICD-10-CM

## 2023-08-17 PROCEDURE — 1159F MED LIST DOCD IN RCRD: CPT | Mod: CPTII,,, | Performed by: STUDENT IN AN ORGANIZED HEALTH CARE EDUCATION/TRAINING PROGRAM

## 2023-08-17 PROCEDURE — 3008F PR BODY MASS INDEX (BMI) DOCUMENTED: ICD-10-PCS | Mod: CPTII,,, | Performed by: STUDENT IN AN ORGANIZED HEALTH CARE EDUCATION/TRAINING PROGRAM

## 2023-08-17 PROCEDURE — 3078F DIAST BP <80 MM HG: CPT | Mod: CPTII,,, | Performed by: STUDENT IN AN ORGANIZED HEALTH CARE EDUCATION/TRAINING PROGRAM

## 2023-08-17 PROCEDURE — 99214 OFFICE O/P EST MOD 30 MIN: CPT | Mod: ,,, | Performed by: STUDENT IN AN ORGANIZED HEALTH CARE EDUCATION/TRAINING PROGRAM

## 2023-08-17 PROCEDURE — 4010F ACE/ARB THERAPY RXD/TAKEN: CPT | Mod: CPTII,,, | Performed by: STUDENT IN AN ORGANIZED HEALTH CARE EDUCATION/TRAINING PROGRAM

## 2023-08-17 PROCEDURE — 3008F BODY MASS INDEX DOCD: CPT | Mod: CPTII,,, | Performed by: STUDENT IN AN ORGANIZED HEALTH CARE EDUCATION/TRAINING PROGRAM

## 2023-08-17 PROCEDURE — 3078F PR MOST RECENT DIASTOLIC BLOOD PRESSURE < 80 MM HG: ICD-10-PCS | Mod: CPTII,,, | Performed by: STUDENT IN AN ORGANIZED HEALTH CARE EDUCATION/TRAINING PROGRAM

## 2023-08-17 PROCEDURE — 99214 PR OFFICE/OUTPT VISIT, EST, LEVL IV, 30-39 MIN: ICD-10-PCS | Mod: ,,, | Performed by: STUDENT IN AN ORGANIZED HEALTH CARE EDUCATION/TRAINING PROGRAM

## 2023-08-17 PROCEDURE — 4010F PR ACE/ARB THEARPY RXD/TAKEN: ICD-10-PCS | Mod: CPTII,,, | Performed by: STUDENT IN AN ORGANIZED HEALTH CARE EDUCATION/TRAINING PROGRAM

## 2023-08-17 PROCEDURE — 1159F PR MEDICATION LIST DOCUMENTED IN MEDICAL RECORD: ICD-10-PCS | Mod: CPTII,,, | Performed by: STUDENT IN AN ORGANIZED HEALTH CARE EDUCATION/TRAINING PROGRAM

## 2023-08-17 PROCEDURE — 3074F PR MOST RECENT SYSTOLIC BLOOD PRESSURE < 130 MM HG: ICD-10-PCS | Mod: CPTII,,, | Performed by: STUDENT IN AN ORGANIZED HEALTH CARE EDUCATION/TRAINING PROGRAM

## 2023-08-17 PROCEDURE — 3074F SYST BP LT 130 MM HG: CPT | Mod: CPTII,,, | Performed by: STUDENT IN AN ORGANIZED HEALTH CARE EDUCATION/TRAINING PROGRAM

## 2023-08-17 RX ORDER — SOLIFENACIN SUCCINATE 10 MG/1
10 TABLET, FILM COATED ORAL DAILY
Status: ON HOLD | COMMUNITY
Start: 2023-08-15 | End: 2023-12-05 | Stop reason: HOSPADM

## 2023-08-17 RX ORDER — GABAPENTIN 100 MG/1
100 CAPSULE ORAL NIGHTLY PRN
COMMUNITY
Start: 2023-08-08 | End: 2023-08-23 | Stop reason: ALTCHOICE

## 2024-02-20 ENCOUNTER — OFFICE VISIT (OUTPATIENT)
Dept: HEMATOLOGY/ONCOLOGY | Facility: CLINIC | Age: 49
End: 2024-02-20
Payer: MEDICAID

## 2024-02-20 VITALS
HEIGHT: 68 IN | WEIGHT: 293 LBS | HEART RATE: 63 BPM | OXYGEN SATURATION: 97 % | BODY MASS INDEX: 44.41 KG/M2 | SYSTOLIC BLOOD PRESSURE: 155 MMHG | RESPIRATION RATE: 18 BRPM | DIASTOLIC BLOOD PRESSURE: 82 MMHG | TEMPERATURE: 98 F

## 2024-02-20 DIAGNOSIS — C18.7 MALIGNANT NEOPLASM OF SIGMOID COLON: Primary | ICD-10-CM

## 2024-02-20 DIAGNOSIS — C18.4 MALIGNANT NEOPLASM OF TRANSVERSE COLON: ICD-10-CM

## 2024-02-20 PROCEDURE — 99215 OFFICE O/P EST HI 40 MIN: CPT | Mod: ,,,

## 2024-02-20 PROCEDURE — 3008F BODY MASS INDEX DOCD: CPT | Mod: CPTII,,,

## 2024-02-20 PROCEDURE — 3077F SYST BP >= 140 MM HG: CPT | Mod: CPTII,,,

## 2024-02-20 PROCEDURE — 3044F HG A1C LEVEL LT 7.0%: CPT | Mod: CPTII,,,

## 2024-02-20 PROCEDURE — 4010F ACE/ARB THERAPY RXD/TAKEN: CPT | Mod: CPTII,,,

## 2024-02-20 PROCEDURE — 1159F MED LIST DOCD IN RCRD: CPT | Mod: CPTII,,,

## 2024-02-20 PROCEDURE — 3079F DIAST BP 80-89 MM HG: CPT | Mod: CPTII,,,

## 2024-02-20 RX ORDER — SOLIFENACIN SUCCINATE 10 MG/1
10 TABLET, FILM COATED ORAL DAILY
COMMUNITY
Start: 2024-02-19

## 2024-02-20 NOTE — PROGRESS NOTES
Subjective:       Patient ID: Isamar Calabrese is a 48 y.o. female.    Chief Complaint: Results        Diagnosis:  - Colon ADC IIA T3 N0 G2     Treatment History:  - status post resection 06/2021    Current Treatment:   - surveillance      Follow-up  Pertinent negatives include no abdominal pain, chest pain, coughing, headaches or rash.         Isamar Calabrese  48 y.o.  female with past medical history significant for HLD, HTN, AKIL on CPAP, chronic headaches, arthritis. 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  Status post resection, but no use of postoperative adjuvant chemotherapy.    Interval History:   Today, 02/20/24, patient denies any acute concerns today.  Patient is somewhat concerned with low platelet count on recent labs. I reassured her this should not cause bleeding issues. I explained that additional labs will be included next visit for thrombocytopenia work-up. She voiced understanding. She denies any change in her bowel habits, blood in his stools, dark tarry stools, easy bruising, bleeding, decreased appetite, weight loss.          Past Medical History:   Diagnosis Date    ADHD (attention deficit hyperactivity disorder)     Anxiety     Arthritis     Cancer     Chronic headaches     Colon cancer     Diabetes mellitus, type 2     HLD (hyperlipidemia)     Hypertension     AKIL (obstructive sleep apnea)     Urinary incontinence     Long time    Vaginal wall prolapse       Past Surgical History:   Procedure Laterality Date    ABLATION OF MEDIAL BRANCH NERVE OF LUMBAR SPINE FACET JOINT Bilateral 11/4/2021    Procedure: ABLATION, NERVE, FACET JOINT, LUMBAR, MEDIAL BRANCH l3,l4,l5;  Surgeon: Anny Betancur MD;  Location: [facility] MAIN OR;  Service: Pain Management;  Laterality: Bilateral;    CARPAL TUNNEL RELEASE Right 05/13/2022    CHOLECYSTECTOMY      COLON SURGERY      INJECTION OF JOINT Bilateral 9/20/2022    Procedure: Injection, Joint, Knee;   Surgeon: Anny Betancur MD;  Location: [facility] MAIN OR;  Service: Pain Management;  Laterality: Bilateral;    INJECTION OF JOINT Bilateral 6/22/2023    Procedure: Injection, Joint, Knee;  Surgeon: Anny Betancur MD;  Location: [facility] MAIN OR;  Service: Pain Management;  Laterality: Bilateral;    RADIOFREQUENCY ABLATION OF LUMBAR MEDIAL BRANCH NERVE AT SINGLE LEVEL Bilateral 12/5/2023    Procedure: Radiofrequency Ablation, Nerve, Spinal, Lumbar, Medial Branch, 1 Level;  Surgeon: Anny Betancur MD;  Location: [facility] MAIN OR;  Service: Pain Management;  Laterality: Bilateral;     Social History     Socioeconomic History    Marital status: Single   Tobacco Use    Smoking status: Never    Smokeless tobacco: Never   Substance and Sexual Activity    Alcohol use: Yes     Alcohol/week: 1.0 standard drink of alcohol     Types: 1 Drinks containing 0.5 oz of alcohol per week     Comment: Maybe 1 every or 2 every two of three months    Drug use: Never    Sexual activity: Not Currently     Partners: Male     Birth control/protection: None     Comment: Maybe every three or four months      Family History   Problem Relation Age of Onset    Colon polyps Mother     Arthritis Mother     Uterine cancer Maternal Grandmother     Cervical cancer Maternal Grandmother     Rheum arthritis Maternal Uncle     Arthritis Maternal Uncle     Osteoarthritis Sister     Hypertension Sister       Review of patient's allergies indicates:   Allergen Reactions    Hydroxyzine Hives      Review of Systems   Constitutional:  Negative for appetite change and unexpected weight change.   HENT:  Negative for mouth sores.    Eyes:  Negative for visual disturbance.   Respiratory:  Negative for cough and shortness of breath.    Cardiovascular:  Negative for chest pain.   Gastrointestinal:  Negative for abdominal pain and diarrhea.   Genitourinary:  Negative for frequency.   Musculoskeletal:  Negative for back pain.   Integumentary:  Negative for rash.   Neurological:   Negative for headaches.   Hematological:  Negative for adenopathy.   Psychiatric/Behavioral:  The patient is not nervous/anxious.          Objective:        Vitals:    02/20/24 1104   BP: (!) 156/85   Pulse: 63   Resp: 18   Temp: 98.4 °F (36.9 °C)            Physical Exam  Constitutional:       General: She is not in acute distress.     Appearance: She is well-developed. She is obese. She is not ill-appearing.   HENT:      Head: Normocephalic and atraumatic.      Mouth/Throat:      Mouth: Mucous membranes are moist.   Eyes:      Extraocular Movements: Extraocular movements intact.      Conjunctiva/sclera: Conjunctivae normal.   Cardiovascular:      Rate and Rhythm: Normal rate and regular rhythm.      Heart sounds: Normal heart sounds.   Pulmonary:      Effort: Pulmonary effort is normal. No respiratory distress.      Breath sounds: Normal breath sounds. No wheezing.   Abdominal:      General: Bowel sounds are normal. There is no distension.      Palpations: Abdomen is soft.      Tenderness: There is no abdominal tenderness.   Musculoskeletal:         General: Normal range of motion.      Cervical back: Normal range of motion and neck supple.   Skin:     General: Skin is warm.      Comments: Skin graft right side   Neurological:      General: No focal deficit present.      Mental Status: She is alert and oriented to person, place, and time. Mental status is at baseline.      Cranial Nerves: No cranial nerve deficit.   Psychiatric:         Mood and Affect: Mood normal.         LABS AND IMAGING REVIEWED     CEA < 1.73, cr 0.95, alb 3.2, ca 8.81, LFTs WNL, wbc 8.91, hgb 11.0, plt 126, ANC 5.76  08/11/2023 CEA less than 1.73, CMP unremarkable, WBC count 10.5, hemoglobin 11.7, MCV 89, platelet count 144.  5/16/23 CEA 1.8 Cr 0.87, alb 3.2, ca 9.18 corrected 9/98 , LFTs WNL, wbc 11.42 , hgb 11.8, plt 166, ANC 8.08  02/07/23 CEA <1.73Cr 0.85, alb 3.4, ca 9.29, LFTs WNL, wbc 9.84, hgb 12.4, plt 159, ANC 6.77  11/8/2022:   WBC 11.61, hemoglobin 12.6, hematocrit 38, CEA less than 1.73 which is lower limit of detectable  2022:  WBC 10.04, hemoglobin 12.3, hematocrit 30.0, plt 144, CEA less than 1.73 which is lower limit of detectable  21 WBC 16.4 Hg 9.7  ANC 12.2  6/3/21 WBC 12 RBC 4.33 Hg 12.9 MCV 91  ANC 8.6 Cr 0.8 Alb 3.4  2021: WBC 11.4, hemoglobin 11.1, platelet 245, CEA < 1.7  December 15, 2021 WBC count 11.57, hemoglobin 11.1, platelet count 174,AST, ALT within normal limits, normal bilirubin.  2022: WBC count 10.13, hemoglobin 11.6, MCV 86.7,. Count 172, creatinine 0.7, AST ALT and alkaline phosphatase within normal limits, CEA< 1.7.    IMAGIN21 CT A/P w/ contrast: Marked circumferential wall thickening involving the right colon at hepatic flexure measuring 4x3.6cm highly suspicious for primary malignancy. Associated constipation involving the proximal right colon with dilatation of proximal right colon. 8mm nodular left adrenal. 7mm mesenteric LN near hepatic flexure, likely reactive    6/3/21 CT A/P w/ contrast: normal liver, spleen, pancreas and adrenals. Large stool burden in right colon. Colonic diverticulosis. Left kidney larger than right. Simple cyst in right kidney.    CT chest with contrast 2021: No evidence of metastatic disease, incidental finding of 4 mm nonspecific sclerotic lesion in the lower sternum likely representing a benign bone island. Status post cholecystectomy    2021 : CT chest abdomen pelvis with contrast with a soft tissue thickening measuring 4 x 3.3 cm with a somewhat masslike configuration without discrete borders, benign postoperative changes versus residual tumor between the loop of distal ileum and lateral abdominal wall. Chest without evidence of tumor or metastatic disease.    2022 CT abdomen pelvis with contrast: Persistent soft tissue thickening/stranding in the right side of the abdomen, the site of partial colectomy, slightly  less pronounced than previously but no discrete mass, findings likely reflect benign postoperative change/fibrosis. A repeat CT of abdomen could be obtained in 6 months to assess for stability, no other significant interval change. No evidence of metastatic disease.    8/10/22 CT CAP:  No acute pulmonary disease or adenopathy in the chest.  Soft tissue prominence at the site of prior ileocolic anastomosis in the right colon appears decreased in comparison.  This finding is less concerning for possible residual/recurrent malignancy    02/09/23 CT CAP: No evidence of metastatic disease. 3.8 cm right ovarian noted.      08/14/2023 CT chest abdomen pelvis with contrast:  No evidence of metastatic disease in the chest, small stable lesion within medial aspect of the right lobe of the liver, possibly small cysts.  2.2 cm left ovarian cyst.  02/15/24 CT CAP w/ contrast No significant change in the chest area. There remains stable tiny nonspecific bilateral pulmonary nodules. MERRILL related to malignancy. Stable hepatic hypodensity most likely a cyst. Right renal cyst. Resolution of left ovarian cyst. Diverticular diease w/o evidence of diverticulitis. Stable nodular appearing adrenal glands.     PATH:      05/26/2023 right breast Biopsy benign intraductal papilloma      6/8/21 terminal ileum, cecum, ascending and transverse colon resection: invasive colonic adenocarcinoma G2. 6.7cm. Extends into subserosal adipose tissue. Multiple discrete tubular adenomas with LGD. Appendix free of tumor. Surgical margins negative, closest radial at 3.5cm. No perforation, LVI-, PNI-, tumor deposits-. 0/28 LNs.  pT3 pN0 G2. Proficient MMR      Assessment:     ECOG Performance status: 0    Cancer of transverse colon C18.4 Stage IIA G2 pT3N0 transverse colon adenocarcinoma w/ no obstruction, no perforation, no LVI, no PNI, negative margins and 0/28 LNs s/p right hemicolectomy 6/8/21.   - Postop CEA less than 1.7. Noted pathology with proficient  MMR  - Patient has had a genetic testing done which reported mutations patient has met with genetic counselor with recommendations for testing her children. Her children have been recommended to undergo colonoscopy starting the age of 25.  - Patient had a colonoscopy in December 2021 at [facility], was found to have 4 polyps which are benign in nature. Will obtain records from pathology.  Colonoscopy 10/25/2022; polyp noted.  Repeat colonoscopy in 3 years  CT chest abdomen pelvis with contrast in August 2023 unremarkable  CT chest abdomen pelvis with contrast in 02/15/24 unremarkable  Plan:     Labs and scans reviewed, noted thrombocytopenia and slight decline in hgb.  Will plan to follow-up in clinic q.6 months with labs and CT chest abdomen pelvis with contrast every 6 months for surveillance for total of 5 years.  Screening mammograms per PCP   Will plan to repeat labs and CT chest abdomen pelvis with contrast on RTC  Thrombocytopenia workup included in follow-up labs along with peripheral smear.     A total of  40 minutes were spent in review of records, interpretation of test, coordination of care, discussion and counseling with the patient.

## 2024-08-22 ENCOUNTER — OFFICE VISIT (OUTPATIENT)
Dept: HEMATOLOGY/ONCOLOGY | Facility: CLINIC | Age: 49
End: 2024-08-22
Payer: MEDICAID

## 2024-08-22 VITALS
WEIGHT: 293 LBS | OXYGEN SATURATION: 98 % | TEMPERATURE: 98 F | HEIGHT: 68 IN | HEART RATE: 82 BPM | SYSTOLIC BLOOD PRESSURE: 119 MMHG | DIASTOLIC BLOOD PRESSURE: 78 MMHG | RESPIRATION RATE: 14 BRPM | BODY MASS INDEX: 44.41 KG/M2

## 2024-08-22 DIAGNOSIS — E53.8 VITAMIN B12 DEFICIENCY: ICD-10-CM

## 2024-08-22 DIAGNOSIS — C18.4 MALIGNANT NEOPLASM OF TRANSVERSE COLON: Primary | ICD-10-CM

## 2024-08-22 DIAGNOSIS — E53.8 FOLIC ACID DEFICIENCY: ICD-10-CM

## 2024-08-22 PROCEDURE — 3078F DIAST BP <80 MM HG: CPT | Mod: CPTII,,, | Performed by: STUDENT IN AN ORGANIZED HEALTH CARE EDUCATION/TRAINING PROGRAM

## 2024-08-22 PROCEDURE — 3044F HG A1C LEVEL LT 7.0%: CPT | Mod: CPTII,,, | Performed by: STUDENT IN AN ORGANIZED HEALTH CARE EDUCATION/TRAINING PROGRAM

## 2024-08-22 PROCEDURE — 4010F ACE/ARB THERAPY RXD/TAKEN: CPT | Mod: CPTII,,, | Performed by: STUDENT IN AN ORGANIZED HEALTH CARE EDUCATION/TRAINING PROGRAM

## 2024-08-22 PROCEDURE — 99214 OFFICE O/P EST MOD 30 MIN: CPT | Mod: ,,, | Performed by: STUDENT IN AN ORGANIZED HEALTH CARE EDUCATION/TRAINING PROGRAM

## 2024-08-22 PROCEDURE — 3008F BODY MASS INDEX DOCD: CPT | Mod: CPTII,,, | Performed by: STUDENT IN AN ORGANIZED HEALTH CARE EDUCATION/TRAINING PROGRAM

## 2024-08-22 PROCEDURE — 3074F SYST BP LT 130 MM HG: CPT | Mod: CPTII,,, | Performed by: STUDENT IN AN ORGANIZED HEALTH CARE EDUCATION/TRAINING PROGRAM

## 2024-08-22 PROCEDURE — 1159F MED LIST DOCD IN RCRD: CPT | Mod: CPTII,,, | Performed by: STUDENT IN AN ORGANIZED HEALTH CARE EDUCATION/TRAINING PROGRAM

## 2024-08-22 RX ORDER — ACETAMINOPHEN, DEXTROMETHORPHAN HBR, DOXYLAMINE SUCCINATE, PHENYLEPHRINE HCL 650; 20; 12.5; 1 MG/30ML; MG/30ML; MG/30ML; MG/30ML
1000 SOLUTION ORAL DAILY
Qty: 30 TABLET | Refills: 6 | Status: SHIPPED | OUTPATIENT
Start: 2024-08-22

## 2024-08-22 RX ORDER — FOLIC ACID 1 MG/1
1 TABLET ORAL DAILY
Qty: 100 TABLET | Refills: 3 | Status: SHIPPED | OUTPATIENT
Start: 2024-08-22 | End: 2025-08-22

## 2024-08-22 NOTE — PROGRESS NOTES
Patient ID: Isamar Calabrese is a 49 y.o. female.    Chief Complaint: Follow-up (Patient reports no new concerns today. )        Diagnosis:  - Colon ADC IIA T3 N0 G2     Treatment History:  - status post resection 06/2021    Current Treatment:   - surveillance      Isamar Calabrese  49 y.o.  female with past medical history significant for HLD, HTN, AKIL on CPAP, chronic headaches, arthritis. 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  Status post resection, but no use of postoperative adjuvant chemotherapy.    Interval History:   Today, 08/22/2024, patient denies any acute concerns today.  She reports persistent bilateral knee pain for which he gets intra-articular steroids.  She denies any change in his bowel habits, blood in his stools, dark tarry stools, decreased appetite or weight loss.  She denies any new lumps or bumps, new medications, ER or hospital visits since last follow-up with us.        Past Medical History:   Diagnosis Date    ADHD (attention deficit hyperactivity disorder)     Anxiety     Arthritis     Cancer     Chronic headaches     Colon cancer     Diabetes mellitus, type 2     HLD (hyperlipidemia)     Hypertension     AKIL (obstructive sleep apnea)     Urinary incontinence     Long time    Vaginal wall prolapse       Past Surgical History:   Procedure Laterality Date    ABLATION OF MEDIAL BRANCH NERVE OF LUMBAR SPINE FACET JOINT Bilateral 11/4/2021    Procedure: ABLATION, NERVE, FACET JOINT, LUMBAR, MEDIAL BRANCH l3,l4,l5;  Surgeon: Anny Betancur MD;  Location: [facility] MAIN OR;  Service: Pain Management;  Laterality: Bilateral;    CARPAL TUNNEL RELEASE Right 05/13/2022    CHOLECYSTECTOMY      COLON SURGERY      INJECTION OF JOINT Bilateral 9/20/2022    Procedure: Injection, Joint, Knee;  Surgeon: Anny Betancur MD;  Location: [facility] MAIN OR;  Service: Pain Management;  Laterality: Bilateral;    INJECTION OF JOINT Bilateral 6/22/2023     Procedure: Injection, Joint, Knee;  Surgeon: Anny Betancur MD;  Location: [facility] MAIN OR;  Service: Pain Management;  Laterality: Bilateral;    INJECTION OF JOINT Bilateral 7/30/2024    Procedure: Injection, Joint, Knee;  Surgeon: Anny Betancur MD;  Location: [facility] MAIN OR;  Service: Pain Management;  Laterality: Bilateral;    RADIOFREQUENCY ABLATION OF LUMBAR MEDIAL BRANCH NERVE AT SINGLE LEVEL Bilateral 12/5/2023    Procedure: Radiofrequency Ablation, Nerve, Spinal, Lumbar, Medial Branch, 1 Level;  Surgeon: Anny Betancur MD;  Location: [facility] MAIN OR;  Service: Pain Management;  Laterality: Bilateral;     Social History     Socioeconomic History    Marital status: Single   Tobacco Use    Smoking status: Never    Smokeless tobacco: Never   Substance and Sexual Activity    Alcohol use: Yes     Alcohol/week: 1.0 standard drink of alcohol     Types: 1 Drinks containing 0.5 oz of alcohol per week     Comment: Maybe 1 every or 2 every two of three months    Drug use: Never    Sexual activity: Not Currently     Partners: Male     Birth control/protection: None     Comment: Maybe every three or four months     Social Determinants of Health     Financial Resource Strain: Low Risk  (8/1/2024)    [facility]    Overall Financial Resource Strain (CARDIA)     Difficulty of Paying Living Expenses: Not hard at all   Food Insecurity: No Food Insecurity (8/1/2024)    Received from [facility]    Hunger Vital Sign     Worried About Running Out of Food in the Last Year: Never true     Ran Out of Food in the Last Year: Never true   Transportation Needs: No Transportation Needs (8/1/2024)    Received from [facility]    PRAPARE - Transportation     Lack of Transportation (Medical): No     Lack of  Transportation (Non-Medical): No   Physical Activity: Insufficiently Active (8/1/2024)    [facility]    Exercise Vital Sign     Days of Exercise per Week: 2 days     Minutes of Exercise per Session: 10 min   Stress: No Stress Concern Present (8/1/2024)    Received from [facility]    [employer] [place] [employer] - Occupational Stress Questionnaire     Feeling of Stress : Only a little   Housing Stability: Low Risk  (8/1/2024)    [facility]    Housing Stability Vital Sign     Unable to Pay for Housing in the Last Year: No     Number of Times Moved in the Last Year: 1     Homeless in the Last Year: No      Family History   Problem Relation Name Age of Onset    Colon polyps Mother Shilpi Thomas     Arthritis Mother Shilpi Thomas     Uterine cancer Maternal Grandmother Sangeeta Calabrese     Cervical cancer Maternal Grandmother Sangeeta Calabrese     Rheum arthritis Maternal Uncle Ihsan Calabrese     Arthritis Maternal Uncle Ihsan Calabrese     Osteoarthritis Sister Myself     Hypertension Sister Myself       Review of patient's allergies indicates:   Allergen Reactions    Hydroxyzine Hives        CONSTITUTIONAL: no fevers, no chills, no weight loss, no fatigue, no weakness  HEMATOLOGIC: no abnormal bleeding, no abnormal bruising, no drenching night sweats  ONCOLOGIC: no new masses or lumps  HEENT: no vision loss, no tinnitus or hearing loss, no nose bleeding, no dysphagia, no odynophagia  CVS: no chest pain, no palpitations, no dyspnea on exertion  RESP: no shortness of breath, no hemoptysis, no cough  GI: no nausea, no vomiting, no diarrhea, no constipation, no melena, no hematochezia, no hematemesis, no abdominal pain, no increase in abdominal girth  : no dysuria, no hematuria, no  hesitancy, no scrotal swelling, no discharge  INTEGUMENT: no rashes, no abnormal bruising, no nail pitting, no hyperpigmentation  NEURO: no falls, no memory loss, no paresthesias or dysesthesias, no urofecal incontinence or retention, no loss of strength on any extremity  MSK: no back pain, no new joint pain, no joint swelling  PSYCH: no suicidal or homicidal ideation, no depression, no insomnia, no anhedonia  ENDOCRINE: no heat or cold intolerance, no polyuria, no polydipsia            Objective:        Vitals:    08/22/24 1420   BP: 119/78   Pulse: 82   Resp: 14   Temp: 97.8 °F (36.6 °C)          Physical Exam:  GA: AAOx3, NAD, morbidly obese  HEENT:  moist oral mucous membranes  RESP:  Equal chest rise, no accessory muscle use  EXT: no deformities, no pedal edema  SKIN: no rashes, warm and dry  NEURO: normal mentation, no gross neuro deficits            LABS AND IMAGING REVIEWED     08/15/2024 creatinine 0.9, albumin 3.0, calcium 9.1, LFTs unremarkable, CEA less than 1.73, iron 29, TIBC 271,% saturation 11, ferritin 93, folic acid 3 point 3, vitamin B12 149, transferrin 217, hepatitis-C antibody 0.17, HIV nonreactive, WBC count 12.7, hemoglobin 10.6, MCV 90.5, platelet count 180, copper 139, hepatitis-B core antibody negative, hepatitis-B surface antigen negative, ANC 9.61,    CEA < 1.73, cr 0.95, alb 3.2, ca 8.81, LFTs WNL, wbc 8.91, hgb 11.0, plt 126, ANC 5.76  08/11/2023 CEA less than 1.73, CMP unremarkable, WBC count 10.5, hemoglobin 11.7, MCV 89, platelet count 144.  5/16/23 CEA 1.8 Cr 0.87, alb 3.2, ca 9.18 corrected 9/98 , LFTs WNL, wbc 11.42 , hgb 11.8, plt 166, ANC 8.08  02/07/23 CEA <1.73Cr 0.85, alb 3.4, ca 9.29, LFTs WNL, wbc 9.84, hgb 12.4, plt 159, ANC 6.77  11/8/2022:  WBC 11.61, hemoglobin 12.6, hematocrit 38, CEA less than 1.73 which is lower limit of detectable  08/09/2022:  WBC 10.04, hemoglobin 12.3, hematocrit 30.0, plt 144, CEA less than 1.73 which is lower limit of detectable  6/12/21 WBC  16.4 Hg 9.7  ANC 12.2  6/3/21 WBC 12 RBC 4.33 Hg 12.9 MCV 91  ANC 8.6 Cr 0.8 Alb 3.4  2021: WBC 11.4, hemoglobin 11.1, platelet 245, CEA < 1.7  December 15, 2021 WBC count 11.57, hemoglobin 11.1, platelet count 174,AST, ALT within normal limits, normal bilirubin.  2022: WBC count 10.13, hemoglobin 11.6, MCV 86.7,. Count 172, creatinine 0.7, AST ALT and alkaline phosphatase within normal limits, CEA< 1.7.    IMAGIN/8/21 CT A/P w/ contrast: Marked circumferential wall thickening involving the right colon at hepatic flexure measuring 4x3.6cm highly suspicious for primary malignancy. Associated constipation involving the proximal right colon with dilatation of proximal right colon. 8mm nodular left adrenal. 7mm mesenteric LN near hepatic flexure, likely reactive    6/3/21 CT A/P w/ contrast: normal liver, spleen, pancreas and adrenals. Large stool burden in right colon. Colonic diverticulosis. Left kidney larger than right. Simple cyst in right kidney.    CT chest with contrast 2021: No evidence of metastatic disease, incidental finding of 4 mm nonspecific sclerotic lesion in the lower sternum likely representing a benign bone island. Status post cholecystectomy    2021 : CT chest abdomen pelvis with contrast with a soft tissue thickening measuring 4 x 3.3 cm with a somewhat masslike configuration without discrete borders, benign postoperative changes versus residual tumor between the loop of distal ileum and lateral abdominal wall. Chest without evidence of tumor or metastatic disease.    2022 CT abdomen pelvis with contrast: Persistent soft tissue thickening/stranding in the right side of the abdomen, the site of partial colectomy, slightly less pronounced than previously but no discrete mass, findings likely reflect benign postoperative change/fibrosis. A repeat CT of abdomen could be obtained in 6 months to assess for stability, no other significant interval change.  No evidence of metastatic disease.    8/10/22 CT CAP:  No acute pulmonary disease or adenopathy in the chest.  Soft tissue prominence at the site of prior ileocolic anastomosis in the right colon appears decreased in comparison.  This finding is less concerning for possible residual/recurrent malignancy    02/09/23 CT CAP: No evidence of metastatic disease. 3.8 cm right ovarian noted.      08/14/2023 CT chest abdomen pelvis with contrast:  No evidence of metastatic disease in the chest, small stable lesion within medial aspect of the right lobe of the liver, possibly small cysts.  2.2 cm left ovarian cyst.  02/15/24 CT CAP w/ contrast No significant change in the chest area. There remains stable tiny nonspecific bilateral pulmonary nodules. MERRILL related to malignancy. Stable hepatic hypodensity most likely a cyst. Right renal cyst. Resolution of left ovarian cyst. Diverticular diease w/o evidence of diverticulitis. Stable nodular appearing adrenal glands.     08/19/2024 CT chest abdomen pelvis with contrast no significant change since prior study with tiny stable nodule in the right lower lobe, no significant change in the abdomen and pelvis since prior study.  Stable small hypodense lesion within the right lobe of the liver likely benign.    PATH:      05/26/2023 right breast Biopsy benign intraductal papilloma      6/8/21 terminal ileum, cecum, ascending and transverse colon resection: invasive colonic adenocarcinoma G2. 6.7cm. Extends into subserosal adipose tissue. Multiple discrete tubular adenomas with LGD. Appendix free of tumor. Surgical margins negative, closest radial at 3.5cm. No perforation, LVI-, PNI-, tumor deposits-. 0/28 LNs.  pT3 pN0 G2. Proficient MMR      Assessment:     ECOG Performance status: 0    Cancer of transverse colon C18.4 Stage IIA G2 pT3N0 transverse colon adenocarcinoma w/ no obstruction, no perforation, no LVI, no PNI, negative margins and 0/28 LNs s/p right hemicolectomy 6/8/21.   -  Postop CEA less than 1.7. Noted pathology with proficient MMR  - Patient has had a genetic testing done which reported mutations patient has met with genetic counselor with recommendations for testing her children. Her children have been recommended to undergo colonoscopy starting the age of 25.  - Patient had a colonoscopy in December 2021 at [facility], was found to have 4 polyps which are benign in nature. Will obtain records from pathology.  Colonoscopy 10/25/2022; polyp noted.  Repeat colonoscopy in 3 years  CT chest abdomen pelvis with contrast in August 2023 unremarkable  CT chest abdomen pelvis with contrast in 02/15/24 unremarkable      # Vitamin B12 deficiency     # Folic acid deficiency    Plan:     Vitamin B12 1000 mcg p.o. q.day   Folic acid 1 mg p.o. q.day   Will plan to follow-up in clinic q.6 months with labs and CT chest abdomen pelvis with contrast every 6 months for surveillance for total of 5 years.  Screening mammograms per PCP   Plan to follow-up in clinic in 6 months with repeat labs and CT for surveillance         Portions of the record may have been created with voice recognition software. Occasional wrong-word or sound-a-like substitutions may have occurred due to the inherent limitations of voice recognition software.

## 2024-10-17 ENCOUNTER — PATIENT MESSAGE (OUTPATIENT)
Dept: RESEARCH | Facility: HOSPITAL | Age: 49
End: 2024-10-17
Payer: MEDICAID

## 2024-11-04 ENCOUNTER — PATIENT MESSAGE (OUTPATIENT)
Dept: ADMINISTRATIVE | Facility: OTHER | Age: 49
End: 2024-11-04
Payer: MEDICAID

## 2024-11-04 PROBLEM — I10 ESSENTIAL HYPERTENSION: Status: ACTIVE | Noted: 2021-06-09

## 2024-11-09 ENCOUNTER — PATIENT MESSAGE (OUTPATIENT)
Dept: OTHER | Facility: OTHER | Age: 49
End: 2024-11-09
Payer: MEDICAID

## 2025-02-24 NOTE — PROGRESS NOTES
Patient ID: Isamar Calabrese is a 49 y.o. female.    Chief Complaint: Results        Diagnosis:  - Colon ADC IIA T3 N0 G2     Treatment History:  - status post resection 06/2021    Current Treatment:   - surveillance      Isamar Calabrese  49 y.o.  female with past medical history significant for HLD, HTN, AKIL on CPAP, chronic headaches, arthritis. 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  Status post resection, but no use of postoperative adjuvant chemotherapy.    Interval History:   Today, 2/25/25, patient denies any acute concerns today.  She reports persistent bilateral knee pain for which he gets intra-articular steroids.  She denies any change in her bowel habits, blood in his stools, dark tarry stools, decreased appetite or weight loss.  She denies any new lumps or bumps, new medications, ER or hospital visits since last follow-up with us. Is scheduled for a back surgery this week 2/27.        Past Medical History:   Diagnosis Date    ADHD (attention deficit hyperactivity disorder)     Anxiety     Arthritis     Cancer     Chronic headaches     Colon cancer     Diabetes mellitus, type 2     HLD (hyperlipidemia)     Hypertension     AKIL (obstructive sleep apnea)     Urinary incontinence     Long time    Vaginal wall prolapse       Past Surgical History:   Procedure Laterality Date    ABLATION OF MEDIAL BRANCH NERVE OF LUMBAR SPINE FACET JOINT Bilateral 11/4/2021    Procedure: ABLATION, NERVE, FACET JOINT, LUMBAR, MEDIAL BRANCH l3,l4,l5;  Surgeon: Anny Betancur MD;  Location: [facility] MAIN OR;  Service: Pain Management;  Laterality: Bilateral;    CARPAL TUNNEL RELEASE Right 05/13/2022    CHOLECYSTECTOMY      COLON SURGERY      INJECTION OF JOINT Bilateral 9/20/2022    Procedure: Injection, Joint, Knee;  Surgeon: Anny Betancur MD;  Location: [facility] MAIN OR;  Service: Pain Management;  Laterality: Bilateral;    INJECTION OF JOINT Bilateral 6/22/2023     Procedure: Injection, Joint, Knee;  Surgeon: Anny Betancur MD;  Location: [facility] MAIN OR;  Service: Pain Management;  Laterality: Bilateral;    INJECTION OF JOINT Bilateral 7/30/2024    Procedure: Injection, Joint, Knee;  Surgeon: Anny Betancur MD;  Location: [facility] MAIN OR;  Service: Pain Management;  Laterality: Bilateral;    RADIOFREQUENCY ABLATION OF LUMBAR MEDIAL BRANCH NERVE AT SINGLE LEVEL Bilateral 12/5/2023    Procedure: Radiofrequency Ablation, Nerve, Spinal, Lumbar, Medial Branch, 1 Level;  Surgeon: Anny Betancur MD;  Location: [facility] MAIN OR;  Service: Pain Management;  Laterality: Bilateral;     Social History     Socioeconomic History    Marital status: Single   Tobacco Use    Smoking status: Never    Smokeless tobacco: Never   Substance and Sexual Activity    Alcohol use: Yes     Alcohol/week: 1.0 standard drink of alcohol     Types: 1 Drinks containing 0.5 oz of alcohol per week     Comment: Maybe 1 every or 2 every two of three months    Drug use: Never    Sexual activity: Not Currently     Partners: Male     Birth control/protection: None     Comment: Maybe every three or four months     Social Drivers of Health     Financial Resource Strain: Low Risk  (8/1/2024)    [facility] [employer] [facility]    Overall Financial Resource Strain (CARDIA)     Difficulty of Paying Living Expenses: Not hard at all   Food Insecurity: No Food Insecurity (8/1/2024)    Received from [employer] [facility] Its Subsidi[facility] and Affiliates    Hunger Vital Sign     Worried About Running Out of Food in the Last Year: Never true     Ran Out of Food in the Last Year: Never true   Transportation Needs: No Transportation Needs (8/1/2024)    Received from [employer] [facility] Subsidiaries and Affiliates    PRAPARE - Transportation     Lack of Transportation (Medical): No     Lack of  Transportation (Non-Medical): No   Physical Activity: Insufficiently Active (8/1/2024)    [facility]    Exercise Vital Sign     Days of Exercise per Week: 2 days     Minutes of Exercise per Session: 10 min   Stress: No Stress Concern Present (8/1/2024)    Received from [employer] [facility]idi[facility] and Affiliates    [employer] - Occupational Stress Questionnaire     Feeling of Stress : Only a little   Housing Stability: Low Risk  (8/1/2024)    [facility]    Housing Stability Vital Sign     Unable to Pay for Housing in the Last Year: No     Number of Times Moved in the Last Year: 1     Homeless in the Last Year: No      Family History   Problem Relation Name Age of Onset    Colon polyps Mother Shilpi Thomas     Arthritis Mother Shilpi Thomas     Uterine cancer Maternal Grandmother Sangeeta Calabrese     Cervical cancer Maternal Grandmother Sangeeta Calabrese     Rheum arthritis Maternal Uncle Ihsan Calabrese     Arthritis Maternal Uncle Ihsan Calabrese     Osteoarthritis Sister Myself     Hypertension Sister Myself       Review of patient's allergies indicates:   Allergen Reactions    Hydroxyzine Hives        CONSTITUTIONAL: no fevers, no chills, no weight loss, no fatigue, no weakness  HEMATOLOGIC: no abnormal bleeding, no abnormal bruising, no drenching night sweats  ONCOLOGIC: no new masses or lumps  HEENT: no vision loss, no tinnitus or hearing loss, no nose bleeding, no dysphagia, no odynophagia  CVS: no chest pain, no palpitations, no dyspnea on exertion  RESP: no shortness of breath, no hemoptysis, no cough  GI: no nausea, no vomiting, no diarrhea, no constipation, no melena, no hematochezia, no hematemesis, no abdominal pain, no increase in abdominal girth  : no dysuria, no hematuria, no  hesitancy, no scrotal swelling, no discharge  INTEGUMENT: no rashes, no abnormal bruising, no nail pitting, no hyperpigmentation  NEURO: no falls, no memory loss, no paresthesias or dysesthesias, no urofecal incontinence or retention, no loss of strength on any extremity  MSK: no back pain, no new joint pain, no joint swelling  PSYCH: no suicidal or homicidal ideation, no depression, no insomnia, no anhedonia  ENDOCRINE: no heat or cold intolerance, no polyuria, no polydipsia            Objective:        Vitals:    02/25/25 1408   BP: (!) 141/82   Pulse: 87   Resp: 16   Temp: 98 °F (36.7 °C)            Physical Exam:  GA: AAOx3, NAD, morbidly obese  HEENT:  moist oral mucous membranes  RESP:  Equal chest rise, no accessory muscle use  EXT: no deformities, no pedal edema  SKIN: no rashes, warm and dry  NEURO: normal mentation, no gross neuro deficits      LABS AND IMAGING REVIEWED   2/19/25 CEA not resulted, Na 146, , Glucose 108, WBC count 9.85, hemoglobin 11.5, MCV 90, platelet count 193.    08/15/2024 creatinine 0.9, albumin 3.0, calcium 9.1, LFTs unremarkable, CEA less than 1.73, iron 29, TIBC 271,% saturation 11, ferritin 93, folic acid 3 point 3, vitamin B12 149, transferrin 217, hepatitis-C antibody 0.17, HIV nonreactive, WBC count 12.7, hemoglobin 10.6, MCV 90.5, platelet count 180, copper 139, hepatitis-B core antibody negative, hepatitis-B surface antigen negative, ANC 9.61,    CEA < 1.73, cr 0.95, alb 3.2, ca 8.81, LFTs WNL, wbc 8.91, hgb 11.0, plt 126, ANC 5.76  08/11/2023 CEA less than 1.73, CMP unremarkable, WBC count 10.5, hemoglobin 11.7, MCV 89, platelet count 144.  5/16/23 CEA 1.8 Cr 0.87, alb 3.2, ca 9.18 corrected 9/98 , LFTs WNL, wbc 11.42 , hgb 11.8, plt 166, ANC 8.08  02/07/23 CEA <1.73Cr 0.85, alb 3.4, ca 9.29, LFTs WNL, wbc 9.84, hgb 12.4, plt 159, ANC 6.77  11/8/2022:  WBC 11.61, hemoglobin 12.6, hematocrit 38, CEA less than 1.73 which is lower limit of detectable  08/09/2022:  WBC  10.04, hemoglobin 12.3, hematocrit 30.0, plt 144, CEA less than 1.73 which is lower limit of detectable  21 WBC 16.4 Hg 9.7  ANC 12.2  6/3/21 WBC 12 RBC 4.33 Hg 12.9 MCV 91  ANC 8.6 Cr 0.8 Alb 3.4  2021: WBC 11.4, hemoglobin 11.1, platelet 245, CEA < 1.7  December 15, 2021 WBC count 11.57, hemoglobin 11.1, platelet count 174,AST, ALT within normal limits, normal bilirubin.  2022: WBC count 10.13, hemoglobin 11.6, MCV 86.7,. Count 172, creatinine 0.7, AST ALT and alkaline phosphatase within normal limits, CEA< 1.7.    IMAGIN/8/21 CT A/P w/ contrast: Marked circumferential wall thickening involving the right colon at hepatic flexure measuring 4x3.6cm highly suspicious for primary malignancy. Associated constipation involving the proximal right colon with dilatation of proximal right colon. 8mm nodular left adrenal. 7mm mesenteric LN near hepatic flexure, likely reactive    6/3/21 CT A/P w/ contrast: normal liver, spleen, pancreas and adrenals. Large stool burden in right colon. Colonic diverticulosis. Left kidney larger than right. Simple cyst in right kidney.    CT chest with contrast 2021: No evidence of metastatic disease, incidental finding of 4 mm nonspecific sclerotic lesion in the lower sternum likely representing a benign bone island. Status post cholecystectomy    2021 : CT chest abdomen pelvis with contrast with a soft tissue thickening measuring 4 x 3.3 cm with a somewhat masslike configuration without discrete borders, benign postoperative changes versus residual tumor between the loop of distal ileum and lateral abdominal wall. Chest without evidence of tumor or metastatic disease.    2022 CT abdomen pelvis with contrast: Persistent soft tissue thickening/stranding in the right side of the abdomen, the site of partial colectomy, slightly less pronounced than previously but no discrete mass, findings likely reflect benign postoperative  change/fibrosis. A repeat CT of abdomen could be obtained in 6 months to assess for stability, no other significant interval change. No evidence of metastatic disease.    8/10/22 CT CAP:  No acute pulmonary disease or adenopathy in the chest.  Soft tissue prominence at the site of prior ileocolic anastomosis in the right colon appears decreased in comparison.  This finding is less concerning for possible residual/recurrent malignancy    02/09/23 CT CAP: No evidence of metastatic disease. 3.8 cm right ovarian noted.      08/14/2023 CT chest abdomen pelvis with contrast:  No evidence of metastatic disease in the chest, small stable lesion within medial aspect of the right lobe of the liver, possibly small cysts.  2.2 cm left ovarian cyst.  02/15/24 CT CAP w/ contrast No significant change in the chest area. There remains stable tiny nonspecific bilateral pulmonary nodules. MERRILL related to malignancy. Stable hepatic hypodensity most likely a cyst. Right renal cyst. Resolution of left ovarian cyst. Diverticular diease w/o evidence of diverticulitis. Stable nodular appearing adrenal glands.     08/19/2024 CT chest abdomen pelvis with contrast no significant change since prior study with tiny stable nodule in the right lower lobe, no significant change in the abdomen and pelvis since prior study.  Stable small hypodense lesion within the right lobe of the liver likely benign.    2/19/25 CT chest abdomen pelvis with contrast   No evidence of metastatic disease in the chest abdomen or pelvis  Stable cystic lesion measuring less than 2 cm which is either in the medial aspect of the liver or possibly emanating from the right adrenal gland.   Stable right renal cyst    PATH:  05/26/2023 right breast Biopsy benign intraductal papilloma    6/8/21 terminal ileum, cecum, ascending and transverse colon resection: invasive colonic adenocarcinoma G2. 6.7cm. Extends into subserosal adipose tissue. Multiple discrete tubular adenomas  with LGD. Appendix free of tumor. Surgical margins negative, closest radial at 3.5cm. No perforation, LVI-, PNI-, tumor deposits-. 0/28 LNs.  pT3 pN0 G2. Proficient MMR      Assessment:     ECOG Performance status: 0    Cancer of transverse colon C18.4 Stage IIA G2 pT3N0 transverse colon adenocarcinoma w/ no obstruction, no perforation, no LVI, no PNI, negative margins and 0/28 LNs s/p right hemicolectomy 6/8/21.   - Postop CEA less than 1.7. Noted pathology with proficient MMR  - Patient has had a genetic testing done which reported mutations patient has met with genetic counselor with recommendations for testing her children. Her children have been recommended to undergo colonoscopy starting the age of 25.  - Patient had a colonoscopy in December 2021 at [facility], was found to have 4 polyps which are benign in nature. Will obtain records from pathology.  Colonoscopy 10/25/2022; polyp noted.  Repeat colonoscopy in 3 years  CT chest abdomen pelvis with contrast in August 2023 unremarkable  CT chest abdomen pelvis with contrast in 02/15/24 unremarkable      # Vitamin B12 deficiency     # Folic acid deficiency    Plan:     Continue Vitamin B12 1000 mcg p.o. q.day   Continue Folic acid 1 mg p.o. q.day   Will plan to follow-up in clinic q.6 months with labs and CT chest abdomen pelvis with contrast every 6 months for surveillance for total of 5 years (2026)  Screening mammograms per PCP   Plan to follow-up in clinic in 6 months with repeat labs and CT for surveillance     Romina PRINGLE-MARTIN    Portions of the record may have been created with voice recognition software. Occasional wrong-word or sound-a-like substitutions may have occurred due to the inherent limitations of voice recognition software.

## 2025-02-25 ENCOUNTER — OFFICE VISIT (OUTPATIENT)
Dept: HEMATOLOGY/ONCOLOGY | Facility: CLINIC | Age: 50
End: 2025-02-25
Payer: MEDICAID

## 2025-02-25 VITALS
RESPIRATION RATE: 16 BRPM | OXYGEN SATURATION: 98 % | WEIGHT: 293 LBS | DIASTOLIC BLOOD PRESSURE: 82 MMHG | BODY MASS INDEX: 44.41 KG/M2 | HEIGHT: 68 IN | HEART RATE: 87 BPM | TEMPERATURE: 98 F | SYSTOLIC BLOOD PRESSURE: 141 MMHG

## 2025-02-25 DIAGNOSIS — C18.7 MALIGNANT NEOPLASM OF SIGMOID COLON: ICD-10-CM

## 2025-02-25 DIAGNOSIS — C18.4 MALIGNANT NEOPLASM OF TRANSVERSE COLON: Primary | ICD-10-CM

## 2025-02-25 PROCEDURE — 3077F SYST BP >= 140 MM HG: CPT | Mod: CPTII,,,

## 2025-02-25 PROCEDURE — 3008F BODY MASS INDEX DOCD: CPT | Mod: CPTII,,,

## 2025-02-25 PROCEDURE — 3044F HG A1C LEVEL LT 7.0%: CPT | Mod: CPTII,,,

## 2025-02-25 PROCEDURE — 1160F RVW MEDS BY RX/DR IN RCRD: CPT | Mod: CPTII,,,

## 2025-02-25 PROCEDURE — 4010F ACE/ARB THERAPY RXD/TAKEN: CPT | Mod: CPTII,,,

## 2025-02-25 PROCEDURE — 1159F MED LIST DOCD IN RCRD: CPT | Mod: CPTII,,,

## 2025-02-25 PROCEDURE — 99215 OFFICE O/P EST HI 40 MIN: CPT | Mod: ,,,

## 2025-02-25 PROCEDURE — 3079F DIAST BP 80-89 MM HG: CPT | Mod: CPTII,,,

## 2025-02-25 RX ORDER — ROPINIROLE 0.5 MG/1
0.5 TABLET, FILM COATED ORAL
COMMUNITY
Start: 2025-02-11 | End: 2026-02-11

## 2025-02-25 RX ORDER — TROSPIUM CHLORIDE 20 MG/1
20 TABLET, FILM COATED ORAL
COMMUNITY
Start: 2025-01-30

## 2025-02-25 RX ORDER — HYDROCORTISONE 25 MG/G
1 CREAM TOPICAL DAILY PRN
COMMUNITY
Start: 2025-02-11

## 2025-08-28 ENCOUNTER — OFFICE VISIT (OUTPATIENT)
Dept: HEMATOLOGY/ONCOLOGY | Facility: CLINIC | Age: 50
End: 2025-08-28
Payer: MEDICAID

## 2025-08-28 VITALS
HEART RATE: 82 BPM | WEIGHT: 293 LBS | BODY MASS INDEX: 44.41 KG/M2 | DIASTOLIC BLOOD PRESSURE: 82 MMHG | TEMPERATURE: 98 F | RESPIRATION RATE: 14 BRPM | HEIGHT: 68 IN | OXYGEN SATURATION: 98 % | SYSTOLIC BLOOD PRESSURE: 147 MMHG

## 2025-08-28 DIAGNOSIS — Z85.038 HISTORY OF COLON CANCER: ICD-10-CM

## 2025-08-28 DIAGNOSIS — C18.4 MALIGNANT NEOPLASM OF TRANSVERSE COLON: Primary | ICD-10-CM

## 2025-08-28 DIAGNOSIS — C18.7 MALIGNANT NEOPLASM OF SIGMOID COLON: ICD-10-CM

## 2025-08-28 PROCEDURE — 3077F SYST BP >= 140 MM HG: CPT | Mod: CPTII,,, | Performed by: STUDENT IN AN ORGANIZED HEALTH CARE EDUCATION/TRAINING PROGRAM

## 2025-08-28 PROCEDURE — 3044F HG A1C LEVEL LT 7.0%: CPT | Mod: CPTII,,, | Performed by: STUDENT IN AN ORGANIZED HEALTH CARE EDUCATION/TRAINING PROGRAM

## 2025-08-28 PROCEDURE — 3008F BODY MASS INDEX DOCD: CPT | Mod: CPTII,,, | Performed by: STUDENT IN AN ORGANIZED HEALTH CARE EDUCATION/TRAINING PROGRAM

## 2025-08-28 PROCEDURE — 1159F MED LIST DOCD IN RCRD: CPT | Mod: CPTII,,, | Performed by: STUDENT IN AN ORGANIZED HEALTH CARE EDUCATION/TRAINING PROGRAM

## 2025-08-28 PROCEDURE — 99214 OFFICE O/P EST MOD 30 MIN: CPT | Mod: ,,, | Performed by: STUDENT IN AN ORGANIZED HEALTH CARE EDUCATION/TRAINING PROGRAM

## 2025-08-28 PROCEDURE — 4010F ACE/ARB THERAPY RXD/TAKEN: CPT | Mod: CPTII,,, | Performed by: STUDENT IN AN ORGANIZED HEALTH CARE EDUCATION/TRAINING PROGRAM

## 2025-08-28 PROCEDURE — 3079F DIAST BP 80-89 MM HG: CPT | Mod: CPTII,,, | Performed by: STUDENT IN AN ORGANIZED HEALTH CARE EDUCATION/TRAINING PROGRAM

## 2025-08-28 RX ORDER — BUTALBITAL, ACETAMINOPHEN AND CAFFEINE 50; 325; 40 MG/1; MG/1; MG/1
1 TABLET ORAL EVERY 6 HOURS PRN
COMMUNITY
Start: 2025-08-27

## 2025-08-28 RX ORDER — TRIAMCINOLONE ACETONIDE 1 MG/G
CREAM TOPICAL 2 TIMES DAILY
COMMUNITY
Start: 2025-08-16